# Patient Record
Sex: FEMALE | Race: WHITE | NOT HISPANIC OR LATINO | URBAN - METROPOLITAN AREA
[De-identification: names, ages, dates, MRNs, and addresses within clinical notes are randomized per-mention and may not be internally consistent; named-entity substitution may affect disease eponyms.]

---

## 2023-06-15 ENCOUNTER — OFFICE VISIT (OUTPATIENT)
Dept: PRIMARY CARE | Facility: CLINIC | Age: 58
End: 2023-06-15
Payer: COMMERCIAL

## 2023-06-15 VITALS
WEIGHT: 230 LBS | DIASTOLIC BLOOD PRESSURE: 71 MMHG | OXYGEN SATURATION: 96 % | SYSTOLIC BLOOD PRESSURE: 108 MMHG | BODY MASS INDEX: 45.16 KG/M2 | HEIGHT: 60 IN | HEART RATE: 73 BPM

## 2023-06-15 DIAGNOSIS — Z00.00 HEALTH CARE MAINTENANCE: ICD-10-CM

## 2023-06-15 DIAGNOSIS — Z12.11 SCREENING FOR MALIGNANT NEOPLASM OF COLON: ICD-10-CM

## 2023-06-15 DIAGNOSIS — F32.A DEPRESSION, UNSPECIFIED DEPRESSION TYPE: ICD-10-CM

## 2023-06-15 DIAGNOSIS — Z12.31 ENCOUNTER FOR SCREENING MAMMOGRAM FOR MALIGNANT NEOPLASM OF BREAST: Primary | ICD-10-CM

## 2023-06-15 DIAGNOSIS — E55.9 VITAMIN D DEFICIENCY: ICD-10-CM

## 2023-06-15 DIAGNOSIS — N89.8 VAGINAL DRYNESS: ICD-10-CM

## 2023-06-15 DIAGNOSIS — K64.4 SKIN TAG OF ANUS: ICD-10-CM

## 2023-06-15 PROCEDURE — 99214 OFFICE O/P EST MOD 30 MIN: CPT | Performed by: NURSE PRACTITIONER

## 2023-06-15 PROCEDURE — 1036F TOBACCO NON-USER: CPT | Performed by: NURSE PRACTITIONER

## 2023-06-15 RX ORDER — BUPROPION HYDROCHLORIDE 150 MG/1
150 TABLET ORAL EVERY MORNING
Qty: 30 TABLET | Refills: 1 | Status: SHIPPED | OUTPATIENT
Start: 2023-06-15 | End: 2023-07-05 | Stop reason: SDUPTHER

## 2023-06-15 RX ORDER — FLUTICASONE PROPIONATE 50 MCG
2 SPRAY, SUSPENSION (ML) NASAL DAILY
COMMUNITY
Start: 2019-05-14 | End: 2023-08-03 | Stop reason: SDUPTHER

## 2023-06-15 RX ORDER — ESCITALOPRAM OXALATE 20 MG/1
20 TABLET ORAL DAILY
COMMUNITY
End: 2023-06-15

## 2023-06-15 RX ORDER — ESTRADIOL 0.1 MG/G
4 CREAM VAGINAL DAILY
Qty: 42.5 G | Refills: 3 | Status: SHIPPED | OUTPATIENT
Start: 2023-06-15 | End: 2023-10-06 | Stop reason: SDUPTHER

## 2023-06-15 RX ORDER — LORATADINE 10 MG/1
10 TABLET ORAL
COMMUNITY

## 2023-06-15 RX ORDER — ALBUTEROL SULFATE 90 UG/1
2 AEROSOL, METERED RESPIRATORY (INHALATION) EVERY 4 HOURS PRN
COMMUNITY
Start: 2023-03-27 | End: 2024-04-19 | Stop reason: ALTCHOICE

## 2023-06-15 RX ORDER — ERGOCALCIFEROL 1.25 MG/1
1 CAPSULE ORAL
Qty: 12 CAPSULE | Refills: 0 | Status: SHIPPED | OUTPATIENT
Start: 2023-06-15 | End: 2023-07-05 | Stop reason: SDUPTHER

## 2023-06-15 RX ORDER — ESTRADIOL 0.1 MG/G
CREAM VAGINAL
COMMUNITY
Start: 2021-08-19 | End: 2023-06-15 | Stop reason: SDUPTHER

## 2023-06-15 RX ORDER — ERGOCALCIFEROL 1.25 MG/1
1 CAPSULE ORAL 2 TIMES WEEKLY
COMMUNITY
End: 2023-06-15 | Stop reason: SDUPTHER

## 2023-06-15 RX ORDER — UBIDECARENONE 30 MG
CAPSULE ORAL
COMMUNITY
Start: 2017-12-05

## 2023-06-15 ASSESSMENT — ENCOUNTER SYMPTOMS
DIZZINESS: 0
PALPITATIONS: 0
HEADACHES: 0
NAUSEA: 0
NUMBNESS: 0
WEAKNESS: 0
CONSTIPATION: 0
CHILLS: 0
COUGH: 0
SHORTNESS OF BREATH: 0
ABDOMINAL PAIN: 0
MUSCULOSKELETAL NEGATIVE: 1
DIARRHEA: 0
VOMITING: 0
PSYCHIATRIC NEGATIVE: 1
SORE THROAT: 0
FATIGUE: 0
FEVER: 0

## 2023-06-15 ASSESSMENT — PATIENT HEALTH QUESTIONNAIRE - PHQ9
1. LITTLE INTEREST OR PLEASURE IN DOING THINGS: NOT AT ALL
SUM OF ALL RESPONSES TO PHQ9 QUESTIONS 1 AND 2: 0
2. FEELING DOWN, DEPRESSED OR HOPELESS: NOT AT ALL

## 2023-06-15 NOTE — PROGRESS NOTES
"Subjective   Patient ID: Irina Joaquin is a 58 y.o. female who presents for follow up.    HPI   Patient here for follow up.  Feels like Lexapro is not working for her.  Has been on Wellbutrin in the past and it worked well for her.  Denies chest pain, SOB, palpitations, dizziness,  or GI issues.  Has anal skin tag that is painful and irritating.  Would like it looked at.      Review of Systems   Constitutional:  Negative for chills, fatigue and fever.   HENT:  Negative for congestion, ear pain and sore throat.    Eyes:  Negative for visual disturbance.   Respiratory:  Negative for cough and shortness of breath.    Cardiovascular:  Negative for chest pain, palpitations and leg swelling.   Gastrointestinal:  Negative for abdominal pain, constipation, diarrhea, nausea and vomiting.   Genitourinary: Negative.    Musculoskeletal: Negative.    Skin:  Negative for rash.   Neurological:  Negative for dizziness, weakness, numbness and headaches.   Psychiatric/Behavioral: Negative.         Objective   /71   Pulse 73   Ht 1.518 m (4' 11.75\")   Wt 104 kg (230 lb)   SpO2 96%   BMI 45.30 kg/m²     Physical Exam  Constitutional:       General: She is not in acute distress.     Appearance: Normal appearance.   HENT:      Head: Normocephalic and atraumatic.      Right Ear: Tympanic membrane, ear canal and external ear normal.      Left Ear: Tympanic membrane, ear canal and external ear normal.      Nose: Nose normal.      Mouth/Throat:      Mouth: Mucous membranes are moist.      Pharynx: Oropharynx is clear.   Eyes:      Extraocular Movements: Extraocular movements intact.      Conjunctiva/sclera: Conjunctivae normal.      Pupils: Pupils are equal, round, and reactive to light.   Cardiovascular:      Rate and Rhythm: Normal rate and regular rhythm.      Pulses: Normal pulses.      Heart sounds: Normal heart sounds. No murmur heard.  Pulmonary:      Effort: Pulmonary effort is normal.      Breath sounds: Normal " breath sounds. No wheezing, rhonchi or rales.   Abdominal:      General: Bowel sounds are normal.      Palpations: Abdomen is soft.      Tenderness: There is no abdominal tenderness.   Musculoskeletal:         General: Normal range of motion.      Cervical back: Normal range of motion and neck supple.   Lymphadenopathy:      Comments: No lymphadenopathy noted   Skin:     General: Skin is warm and dry.      Findings: No rash.      Comments: Large skin tag with very small stem located close to anus.   Neurological:      General: No focal deficit present.      Mental Status: She is alert and oriented to person, place, and time.      Cranial Nerves: No cranial nerve deficit.      Coordination: Coordination normal.      Gait: Gait normal.   Psychiatric:         Mood and Affect: Mood normal.         Behavior: Behavior normal.         Assessment/Plan   Problem List Items Addressed This Visit          Genitourinary    Vaginal dryness     Continue estrogen cream as prescribed         Relevant Medications    estradiol (Estrace) 0.1 MG/GM vaginal cream       Endocrine/Metabolic    Vitamin D deficiency     Restart vitamin D 50,000 units once weekly for 12 weeks, then continue 2000 units capsule over-the-counter daily.  Checking vitamin D level now         Relevant Medications    ergocalciferol (Vitamin D-2) 1.25 MG (03109 UT) capsule    Other Relevant Orders    Vitamin D, Total       Other    Depression     Stop Lexapro.  Start Wellbutrin 150 mg today  We will follow-up in 6 weeks to see how you are doing on medications         Relevant Medications    buPROPion XL (Wellbutrin XL) 150 mg 24 hr tablet    Screening for malignant neoplasm of colon - Primary    Relevant Orders    Cologuard® colon cancer screening     Other Visit Diagnoses       Health care maintenance        Relevant Orders    Comprehensive Metabolic Panel    TSH with reflex to Free T4 if abnormal    Lipid Panel    CBC and Auto Differential        Skin tag of anus  removed.  Area prepped and cleaned.  1% lidocaine administered.  Small stab of skin tag  with scalpel.  Minimal bleeding.  Patient tolerated well without pain.  Treated area with triple antibiotic cream

## 2023-06-15 NOTE — ASSESSMENT & PLAN NOTE
Stop Lexapro.  Start Wellbutrin 150 mg today  We will follow-up in 6 weeks to see how you are doing on medications

## 2023-06-15 NOTE — ASSESSMENT & PLAN NOTE
Restart vitamin D 50,000 units once weekly for 12 weeks, then continue 2000 units capsule over-the-counter daily.  Checking vitamin D level now

## 2023-06-23 ENCOUNTER — LAB (OUTPATIENT)
Dept: LAB | Facility: LAB | Age: 58
End: 2023-06-23
Payer: COMMERCIAL

## 2023-06-23 DIAGNOSIS — E55.9 VITAMIN D DEFICIENCY: ICD-10-CM

## 2023-06-23 DIAGNOSIS — Z00.00 HEALTH CARE MAINTENANCE: ICD-10-CM

## 2023-06-23 LAB
ALANINE AMINOTRANSFERASE (SGPT) (U/L) IN SER/PLAS: 12 U/L (ref 7–45)
ALBUMIN (G/DL) IN SER/PLAS: 3.9 G/DL (ref 3.4–5)
ALKALINE PHOSPHATASE (U/L) IN SER/PLAS: 94 U/L (ref 33–136)
ANION GAP IN SER/PLAS: 13 MMOL/L (ref 10–20)
ASPARTATE AMINOTRANSFERASE (SGOT) (U/L) IN SER/PLAS: 13 U/L (ref 9–39)
BASOPHILS (10*3/UL) IN BLOOD BY AUTOMATED COUNT: 0.02 X10E9/L (ref 0–0.1)
BASOPHILS/100 LEUKOCYTES IN BLOOD BY AUTOMATED COUNT: 0.3 % (ref 0–2)
BILIRUBIN TOTAL (MG/DL) IN SER/PLAS: 0.6 MG/DL (ref 0–1.2)
CALCIDIOL (25 OH VITAMIN D3) (NG/ML) IN SER/PLAS: 32 NG/ML
CALCIUM (MG/DL) IN SER/PLAS: 8.9 MG/DL (ref 8.6–10.6)
CARBON DIOXIDE, TOTAL (MMOL/L) IN SER/PLAS: 25 MMOL/L (ref 21–32)
CHLORIDE (MMOL/L) IN SER/PLAS: 109 MMOL/L (ref 98–107)
CHOLESTEROL (MG/DL) IN SER/PLAS: 168 MG/DL (ref 0–199)
CHOLESTEROL IN HDL (MG/DL) IN SER/PLAS: 47.8 MG/DL
CHOLESTEROL/HDL RATIO: 3.5
CREATININE (MG/DL) IN SER/PLAS: 0.62 MG/DL (ref 0.5–1.05)
EOSINOPHILS (10*3/UL) IN BLOOD BY AUTOMATED COUNT: 0 X10E9/L (ref 0–0.7)
EOSINOPHILS/100 LEUKOCYTES IN BLOOD BY AUTOMATED COUNT: 0 % (ref 0–6)
ERYTHROCYTE DISTRIBUTION WIDTH (RATIO) BY AUTOMATED COUNT: 14.9 % (ref 11.5–14.5)
ERYTHROCYTE MEAN CORPUSCULAR HEMOGLOBIN CONCENTRATION (G/DL) BY AUTOMATED: 30.4 G/DL (ref 32–36)
ERYTHROCYTE MEAN CORPUSCULAR VOLUME (FL) BY AUTOMATED COUNT: 81 FL (ref 80–100)
ERYTHROCYTES (10*6/UL) IN BLOOD BY AUTOMATED COUNT: 4.81 X10E12/L (ref 4–5.2)
GFR FEMALE: >90 ML/MIN/1.73M2
GLUCOSE (MG/DL) IN SER/PLAS: 95 MG/DL (ref 74–99)
HEMATOCRIT (%) IN BLOOD BY AUTOMATED COUNT: 39.1 % (ref 36–46)
HEMOGLOBIN (G/DL) IN BLOOD: 11.9 G/DL (ref 12–16)
IMMATURE GRANULOCYTES/100 LEUKOCYTES IN BLOOD BY AUTOMATED COUNT: 0.2 % (ref 0–0.9)
LDL: 103 MG/DL (ref 0–99)
LEUKOCYTES (10*3/UL) IN BLOOD BY AUTOMATED COUNT: 5.8 X10E9/L (ref 4.4–11.3)
LYMPHOCYTES (10*3/UL) IN BLOOD BY AUTOMATED COUNT: 2.15 X10E9/L (ref 1.2–4.8)
LYMPHOCYTES/100 LEUKOCYTES IN BLOOD BY AUTOMATED COUNT: 37.1 % (ref 13–44)
MONOCYTES (10*3/UL) IN BLOOD BY AUTOMATED COUNT: 0.7 X10E9/L (ref 0.1–1)
MONOCYTES/100 LEUKOCYTES IN BLOOD BY AUTOMATED COUNT: 12.1 % (ref 2–10)
NEUTROPHILS (10*3/UL) IN BLOOD BY AUTOMATED COUNT: 2.91 X10E9/L (ref 1.2–7.7)
NEUTROPHILS/100 LEUKOCYTES IN BLOOD BY AUTOMATED COUNT: 50.3 % (ref 40–80)
NRBC (PER 100 WBCS) BY AUTOMATED COUNT: 0 /100 WBC (ref 0–0)
PLATELETS (10*3/UL) IN BLOOD AUTOMATED COUNT: 265 X10E9/L (ref 150–450)
POTASSIUM (MMOL/L) IN SER/PLAS: 4.4 MMOL/L (ref 3.5–5.3)
PROTEIN TOTAL: 5.9 G/DL (ref 6.4–8.2)
SODIUM (MMOL/L) IN SER/PLAS: 143 MMOL/L (ref 136–145)
THYROTROPIN (MIU/L) IN SER/PLAS BY DETECTION LIMIT <= 0.05 MIU/L: 2.9 MIU/L (ref 0.44–3.98)
TRIGLYCERIDE (MG/DL) IN SER/PLAS: 88 MG/DL (ref 0–149)
UREA NITROGEN (MG/DL) IN SER/PLAS: 17 MG/DL (ref 6–23)
VLDL: 18 MG/DL (ref 0–40)

## 2023-06-23 PROCEDURE — 80061 LIPID PANEL: CPT

## 2023-06-23 PROCEDURE — 36415 COLL VENOUS BLD VENIPUNCTURE: CPT

## 2023-06-23 PROCEDURE — 82306 VITAMIN D 25 HYDROXY: CPT

## 2023-06-23 PROCEDURE — 80053 COMPREHEN METABOLIC PANEL: CPT

## 2023-06-23 PROCEDURE — 84443 ASSAY THYROID STIM HORMONE: CPT

## 2023-06-23 PROCEDURE — 85025 COMPLETE CBC W/AUTO DIFF WBC: CPT

## 2023-06-28 ENCOUNTER — APPOINTMENT (OUTPATIENT)
Dept: PRIMARY CARE | Facility: CLINIC | Age: 58
End: 2023-06-28
Payer: COMMERCIAL

## 2023-07-04 LAB — NONINV COLON CA DNA+OCC BLD SCRN STL QL: NEGATIVE

## 2023-07-05 ENCOUNTER — OFFICE VISIT (OUTPATIENT)
Dept: PRIMARY CARE | Facility: CLINIC | Age: 58
End: 2023-07-05
Payer: COMMERCIAL

## 2023-07-05 VITALS
OXYGEN SATURATION: 95 % | DIASTOLIC BLOOD PRESSURE: 79 MMHG | WEIGHT: 230 LBS | SYSTOLIC BLOOD PRESSURE: 124 MMHG | HEIGHT: 60 IN | HEART RATE: 98 BPM | BODY MASS INDEX: 45.16 KG/M2

## 2023-07-05 DIAGNOSIS — M17.12 PRIMARY OSTEOARTHRITIS OF LEFT KNEE: ICD-10-CM

## 2023-07-05 DIAGNOSIS — F32.A DEPRESSION, UNSPECIFIED DEPRESSION TYPE: ICD-10-CM

## 2023-07-05 DIAGNOSIS — N89.8 VAGINAL DRYNESS: ICD-10-CM

## 2023-07-05 DIAGNOSIS — M25.562 PAIN IN BOTH KNEES, UNSPECIFIED CHRONICITY: ICD-10-CM

## 2023-07-05 DIAGNOSIS — E55.9 VITAMIN D DEFICIENCY: ICD-10-CM

## 2023-07-05 DIAGNOSIS — M25.561 PAIN IN BOTH KNEES, UNSPECIFIED CHRONICITY: ICD-10-CM

## 2023-07-05 DIAGNOSIS — R53.83 OTHER FATIGUE: ICD-10-CM

## 2023-07-05 DIAGNOSIS — D64.9 ANEMIA, UNSPECIFIED TYPE: Primary | ICD-10-CM

## 2023-07-05 PROCEDURE — 99214 OFFICE O/P EST MOD 30 MIN: CPT | Performed by: NURSE PRACTITIONER

## 2023-07-05 PROCEDURE — 1036F TOBACCO NON-USER: CPT | Performed by: NURSE PRACTITIONER

## 2023-07-05 RX ORDER — BUPROPION HYDROCHLORIDE 150 MG/1
150 TABLET ORAL EVERY MORNING
Qty: 90 TABLET | Refills: 1 | Status: SHIPPED | OUTPATIENT
Start: 2023-07-05 | End: 2023-08-03 | Stop reason: SDUPTHER

## 2023-07-05 RX ORDER — ERGOCALCIFEROL 1.25 MG/1
1 CAPSULE ORAL
Qty: 12 CAPSULE | Refills: 0 | Status: SHIPPED | OUTPATIENT
Start: 2023-07-05 | End: 2023-08-18 | Stop reason: ALTCHOICE

## 2023-07-05 ASSESSMENT — ENCOUNTER SYMPTOMS
MUSCULOSKELETAL NEGATIVE: 1
NAUSEA: 0
FATIGUE: 1
PALPITATIONS: 0
HEADACHES: 0
CONSTIPATION: 0
NUMBNESS: 0
WEAKNESS: 0
SHORTNESS OF BREATH: 0
ABDOMINAL PAIN: 0
VOMITING: 0
DIZZINESS: 0
SORE THROAT: 0
DIARRHEA: 0
COUGH: 0
CHILLS: 0
FEVER: 0
PSYCHIATRIC NEGATIVE: 1

## 2023-07-05 NOTE — PROGRESS NOTES
"Subjective   Patient ID: Irina Joaquin is a 58 y.o. female who presents for follow up.     HPI   Here for medication follow-up.  So far mood is good on Wellbutrin  Is having increased knee pain.  Would like orthopedic referral.  Has flesh colored mole on the side of her face.  Friend said she should get it checked out.  Has Derm appointment in November.  Also has 2 moles 1 on her upper lip and one on her left cheek that are dark have been unchanged for a long time.  Denies chest pain, SOB, palpitations, dizziness,  or GI issues.    Reviewed lab results and mammogram results    Review of Systems   Constitutional:  Positive for fatigue. Negative for chills and fever.   HENT:  Negative for congestion, ear pain and sore throat.    Eyes:  Negative for visual disturbance.   Respiratory:  Negative for cough and shortness of breath.    Cardiovascular:  Negative for chest pain, palpitations and leg swelling.   Gastrointestinal:  Negative for abdominal pain, constipation, diarrhea, nausea and vomiting.   Genitourinary: Negative.    Musculoskeletal: Negative.    Skin:  Negative for rash.   Neurological:  Negative for dizziness, weakness, numbness and headaches.   Psychiatric/Behavioral: Negative.         Objective   /79   Pulse 98   Ht 1.518 m (4' 11.75\")   Wt 104 kg (230 lb)   SpO2 95%   BMI 45.30 kg/m²     Physical Exam  Constitutional:       General: She is not in acute distress.     Appearance: Normal appearance.   HENT:      Head: Normocephalic and atraumatic.      Right Ear: Tympanic membrane, ear canal and external ear normal.      Left Ear: Tympanic membrane, ear canal and external ear normal.      Nose: Nose normal.      Mouth/Throat:      Mouth: Mucous membranes are moist.      Pharynx: Oropharynx is clear.   Eyes:      Extraocular Movements: Extraocular movements intact.      Conjunctiva/sclera: Conjunctivae normal.      Pupils: Pupils are equal, round, and reactive to light.   Cardiovascular:      " Rate and Rhythm: Normal rate and regular rhythm.      Pulses: Normal pulses.      Heart sounds: Normal heart sounds. No murmur heard.  Pulmonary:      Effort: Pulmonary effort is normal.      Breath sounds: Normal breath sounds. No wheezing, rhonchi or rales.   Abdominal:      General: Bowel sounds are normal.      Palpations: Abdomen is soft.      Tenderness: There is no abdominal tenderness.   Musculoskeletal:         General: Normal range of motion.      Cervical back: Normal range of motion and neck supple.   Lymphadenopathy:      Comments: No lymphadenopathy noted   Skin:     General: Skin is warm and dry.      Findings: No rash.   Neurological:      General: No focal deficit present.      Mental Status: She is alert and oriented to person, place, and time.      Cranial Nerves: No cranial nerve deficit.      Coordination: Coordination normal.      Gait: Gait normal.   Psychiatric:         Mood and Affect: Mood normal.         Behavior: Behavior normal.       Assessment/Plan   Problem List Items Addressed This Visit       Depression     Continue Wellbutrin as prescribed  We will have medication check again in 3 months         Relevant Medications    buPROPion XL (Wellbutrin XL) 150 mg 24 hr tablet    Vitamin D deficiency     Continue vitamin D 50,000 units every week for 12 weeks following initial 12 weeks we will check vitamin D at 6-month visit         Relevant Medications    ergocalciferol (Vitamin D-2) 1.25 MG (52533 UT) capsule    Vaginal dryness     Continue vaginal estrogen cream as prescribed         Screening for malignant neoplasm of colon    Fatigue     We will check iron and ferritin levels.  Has history of iron infusions         Anemia - Primary    Relevant Orders    Iron and TIBC    Ferritin    Primary osteoarthritis of left knee     Orthopedic referral for bilateral knee pain          Other Visit Diagnoses       Pain in both knees, unspecified chronicity        Relevant Orders    Referral to  Orthopaedic Surgery

## 2023-07-05 NOTE — ASSESSMENT & PLAN NOTE
Continue vitamin D 50,000 units every week for 12 weeks following initial 12 weeks we will check vitamin D at 6-month visit

## 2023-07-12 ENCOUNTER — LAB (OUTPATIENT)
Dept: LAB | Facility: LAB | Age: 58
End: 2023-07-12
Payer: COMMERCIAL

## 2023-07-12 DIAGNOSIS — D64.9 ANEMIA, UNSPECIFIED TYPE: ICD-10-CM

## 2023-07-12 LAB
FERRITIN (UG/LL) IN SER/PLAS: 58 UG/L (ref 8–150)
IRON (UG/DL) IN SER/PLAS: 56 UG/DL (ref 35–150)
IRON BINDING CAPACITY (UG/DL) IN SER/PLAS: 439 UG/DL (ref 240–445)
IRON SATURATION (%) IN SER/PLAS: 13 % (ref 25–45)

## 2023-07-12 PROCEDURE — 36415 COLL VENOUS BLD VENIPUNCTURE: CPT

## 2023-07-12 PROCEDURE — 82728 ASSAY OF FERRITIN: CPT

## 2023-07-12 PROCEDURE — 83540 ASSAY OF IRON: CPT

## 2023-07-12 PROCEDURE — 83550 IRON BINDING TEST: CPT

## 2023-07-22 LAB — URINE CULTURE: NO GROWTH

## 2023-08-02 ASSESSMENT — ENCOUNTER SYMPTOMS
VOMITING: 0
DIZZINESS: 0
SORE THROAT: 0
NUMBNESS: 0
FEVER: 0
WEAKNESS: 0
DIARRHEA: 0
ABDOMINAL PAIN: 0
MUSCULOSKELETAL NEGATIVE: 1
NAUSEA: 0
PALPITATIONS: 0
HEADACHES: 0
CONSTIPATION: 0
PSYCHIATRIC NEGATIVE: 1
CHILLS: 0

## 2023-08-02 NOTE — PROGRESS NOTES
"Subjective   Patient ID: Irina Joaquin is a 58 y.o. female who presents for cough.      HPI   patient here today for persistent cough and congestion.   Onset: 7/21/23  congestion, cough, fever, cold sore, was on Doxy given to her by urgent care. Finished course on Monday.   Symptoms continue. Very SOB with any exertion.  Continues to cough with talking.  Unable to sleep lying flat has to sit up in chair.  Has been using albuterol inhaler with no improvement.  Feels like antibiotic may have helped her some but still feeling poorly.  Pulse ox has been stable.  Denies chest pain, palpitations, dizziness,  or GI issues.  Taking medications as prescribed   Feeling good on bupropion, would like to continue.  Hx of gastric bypass.    Review of Systems   Constitutional:  Positive for fatigue. Negative for chills and fever.   HENT:  Positive for congestion, sinus pressure and sinus pain. Negative for ear pain and sore throat.    Eyes:  Negative for visual disturbance.   Respiratory:  Positive for cough and shortness of breath.    Cardiovascular:  Negative for chest pain, palpitations and leg swelling.   Gastrointestinal:  Negative for abdominal pain, constipation, diarrhea, nausea and vomiting.   Genitourinary: Negative.    Musculoskeletal: Negative.    Skin:  Negative for rash.   Neurological:  Negative for dizziness, weakness, numbness and headaches.   Psychiatric/Behavioral: Negative.         Objective   /60   Pulse 106   Ht 1.518 m (4' 11.75\")   Wt 102 kg (225 lb)   SpO2 96%   BMI 44.31 kg/m²     Physical Exam  Constitutional:       General: She is not in acute distress.     Appearance: Normal appearance.   HENT:      Head: Normocephalic and atraumatic.      Right Ear: Tympanic membrane, ear canal and external ear normal.      Left Ear: Tympanic membrane, ear canal and external ear normal.      Nose: Nose normal.      Mouth/Throat:      Mouth: Mucous membranes are moist.      Pharynx: Oropharynx is clear. "   Eyes:      Extraocular Movements: Extraocular movements intact.      Conjunctiva/sclera: Conjunctivae normal.      Pupils: Pupils are equal, round, and reactive to light.   Cardiovascular:      Rate and Rhythm: Normal rate and regular rhythm.      Pulses: Normal pulses.      Heart sounds: Normal heart sounds. No murmur heard.  Pulmonary:      Effort: Pulmonary effort is normal.      Breath sounds: Normal breath sounds. Decreased air movement present. No wheezing, rhonchi or rales.   Abdominal:      General: Bowel sounds are normal.      Palpations: Abdomen is soft.      Tenderness: There is no abdominal tenderness.   Musculoskeletal:         General: Normal range of motion.      Cervical back: Normal range of motion and neck supple.   Lymphadenopathy:      Comments: No lymphadenopathy noted   Skin:     General: Skin is warm and dry.      Findings: No rash.   Neurological:      General: No focal deficit present.      Mental Status: She is alert and oriented to person, place, and time.      Cranial Nerves: No cranial nerve deficit.      Coordination: Coordination normal.      Gait: Gait normal.   Psychiatric:         Mood and Affect: Mood normal.         Behavior: Behavior normal.       Assessment/Plan   Problem List Items Addressed This Visit       Depression     Continue Wellbutrin as prescribed          Relevant Medications    buPROPion XL (Wellbutrin XL) 150 mg 24 hr tablet    Upper respiratory tract infection - Primary    Relevant Medications    albuterol 2.5 mg /3 mL (0.083 %) nebulizer solution    cephalexin (Keflex) 500 mg capsule    fluticasone (Flonase) 50 mcg/actuation nasal spray    furosemide (Lasix) 40 mg tablet    Other Relevant Orders    XR chest 2 views    Aerosol Machine for home use - Purchase    Dyspnea    Relevant Medications    albuterol 2.5 mg /3 mL (0.083 %) nebulizer solution    cephalexin (Keflex) 500 mg capsule    fluticasone (Flonase) 50 mcg/actuation nasal spray    furosemide (Lasix) 40  mg tablet    Other Relevant Orders    XR chest 2 views    Aerosol Machine for home use - Purchase

## 2023-08-03 ENCOUNTER — APPOINTMENT (OUTPATIENT)
Dept: PRIMARY CARE | Facility: CLINIC | Age: 58
End: 2023-08-03
Payer: COMMERCIAL

## 2023-08-03 ENCOUNTER — OFFICE VISIT (OUTPATIENT)
Dept: PRIMARY CARE | Facility: CLINIC | Age: 58
End: 2023-08-03
Payer: COMMERCIAL

## 2023-08-03 VITALS
WEIGHT: 225 LBS | HEIGHT: 60 IN | HEART RATE: 106 BPM | OXYGEN SATURATION: 96 % | SYSTOLIC BLOOD PRESSURE: 122 MMHG | BODY MASS INDEX: 44.17 KG/M2 | DIASTOLIC BLOOD PRESSURE: 60 MMHG

## 2023-08-03 DIAGNOSIS — R06.00 DYSPNEA, UNSPECIFIED TYPE: ICD-10-CM

## 2023-08-03 DIAGNOSIS — J06.9 UPPER RESPIRATORY TRACT INFECTION, UNSPECIFIED TYPE: ICD-10-CM

## 2023-08-03 DIAGNOSIS — F32.A DEPRESSION, UNSPECIFIED DEPRESSION TYPE: ICD-10-CM

## 2023-08-03 DIAGNOSIS — J06.9 UPPER RESPIRATORY TRACT INFECTION, UNSPECIFIED TYPE: Primary | ICD-10-CM

## 2023-08-03 PROBLEM — K59.00 CONSTIPATION: Status: ACTIVE | Noted: 2023-08-03

## 2023-08-03 PROBLEM — J32.9 SINOBRONCHITIS: Status: ACTIVE | Noted: 2023-08-03

## 2023-08-03 PROBLEM — K91.2 MALNUTRITION FOLLOWING GASTROINTESTINAL SURGERY (HHS-HCC): Status: ACTIVE | Noted: 2023-08-03

## 2023-08-03 PROBLEM — R09.81 SINUS CONGESTION: Status: ACTIVE | Noted: 2023-08-03

## 2023-08-03 PROBLEM — Z20.822 SUSPECTED COVID-19 VIRUS INFECTION: Status: ACTIVE | Noted: 2023-08-03

## 2023-08-03 PROBLEM — R43.0 ANOSMIA: Status: ACTIVE | Noted: 2023-08-03

## 2023-08-03 PROBLEM — J02.9 ACUTE PHARYNGITIS: Status: ACTIVE | Noted: 2023-08-03

## 2023-08-03 PROBLEM — B97.7 HPV (HUMAN PAPILLOMA VIRUS) INFECTION: Status: ACTIVE | Noted: 2023-08-03

## 2023-08-03 PROBLEM — N87.0 MILD CERVICAL DYSPLASIA: Status: ACTIVE | Noted: 2023-08-03

## 2023-08-03 PROBLEM — L08.9 SOFT TISSUE INFECTION: Status: ACTIVE | Noted: 2023-08-03

## 2023-08-03 PROBLEM — Z98.84 S/P BARIATRIC SURGERY: Status: ACTIVE | Noted: 2023-08-03

## 2023-08-03 PROBLEM — H69.91 DYSFUNCTION OF RIGHT EUSTACHIAN TUBE: Status: ACTIVE | Noted: 2023-08-03

## 2023-08-03 PROBLEM — H66.90 ACUTE OTITIS MEDIA: Status: ACTIVE | Noted: 2023-08-03

## 2023-08-03 PROBLEM — Z98.84 GASTRIC BYPASS STATUS FOR OBESITY: Status: ACTIVE | Noted: 2023-08-03

## 2023-08-03 PROBLEM — M77.50 CALCANEAL BURSITIS (HEEL): Status: ACTIVE | Noted: 2017-03-17

## 2023-08-03 PROBLEM — K22.0 APERISTALSIS OF ESOPHAGUS: Status: ACTIVE | Noted: 2023-08-03

## 2023-08-03 PROBLEM — J34.89 NASAL CONGESTION WITH RHINORRHEA: Status: ACTIVE | Noted: 2023-08-03

## 2023-08-03 PROBLEM — J40 SINOBRONCHITIS: Status: ACTIVE | Noted: 2023-08-03

## 2023-08-03 PROBLEM — T81.49XA WOUND INFECTION AFTER SURGERY: Status: ACTIVE | Noted: 2023-08-03

## 2023-08-03 PROBLEM — R09.81 NASAL CONGESTION WITH RHINORRHEA: Status: ACTIVE | Noted: 2023-08-03

## 2023-08-03 PROBLEM — R51.9 HEADACHE: Status: ACTIVE | Noted: 2023-08-03

## 2023-08-03 PROBLEM — R11.10 REGURGITATION OF FOOD: Status: ACTIVE | Noted: 2023-08-03

## 2023-08-03 PROBLEM — R21 RASH: Status: ACTIVE | Noted: 2023-08-03

## 2023-08-03 PROBLEM — N39.46 URGE AND STRESS INCONTINENCE: Status: ACTIVE | Noted: 2023-08-03

## 2023-08-03 PROBLEM — M72.2 PLANTAR FASCIITIS OF RIGHT FOOT: Status: ACTIVE | Noted: 2017-02-10

## 2023-08-03 PROBLEM — R10.819 ABDOMINAL TENDERNESS: Status: ACTIVE | Noted: 2023-08-03

## 2023-08-03 PROCEDURE — 1036F TOBACCO NON-USER: CPT | Performed by: NURSE PRACTITIONER

## 2023-08-03 PROCEDURE — 99214 OFFICE O/P EST MOD 30 MIN: CPT | Performed by: NURSE PRACTITIONER

## 2023-08-03 RX ORDER — ALBUTEROL SULFATE 0.83 MG/ML
2.5 SOLUTION RESPIRATORY (INHALATION) 4 TIMES DAILY PRN
Qty: 25 ML | Refills: 3 | Status: SHIPPED | OUTPATIENT
Start: 2023-08-03 | End: 2024-01-19 | Stop reason: SDUPTHER

## 2023-08-03 RX ORDER — FUROSEMIDE 40 MG/1
40 TABLET ORAL DAILY
Qty: 30 TABLET | Refills: 0 | Status: SHIPPED | OUTPATIENT
Start: 2023-08-03 | End: 2024-04-19 | Stop reason: WASHOUT

## 2023-08-03 RX ORDER — FUROSEMIDE 40 MG/1
40 TABLET ORAL DAILY
Qty: 3 TABLET | Refills: 0 | OUTPATIENT
Start: 2023-08-03 | End: 2024-08-02

## 2023-08-03 RX ORDER — ALBUTEROL SULFATE 0.83 MG/ML
2.5 SOLUTION RESPIRATORY (INHALATION) 4 TIMES DAILY PRN
Qty: 25 ML | Refills: 3 | Status: SHIPPED | OUTPATIENT
Start: 2023-08-03 | End: 2023-08-03

## 2023-08-03 RX ORDER — BUPROPION HYDROCHLORIDE 150 MG/1
150 TABLET ORAL EVERY MORNING
Qty: 90 TABLET | Refills: 1 | Status: SHIPPED | OUTPATIENT
Start: 2023-08-03 | End: 2023-08-18 | Stop reason: SDUPTHER

## 2023-08-03 RX ORDER — FUROSEMIDE 40 MG/1
40 TABLET ORAL 2 TIMES DAILY
Qty: 60 TABLET | Refills: 11 | Status: SHIPPED | OUTPATIENT
Start: 2023-08-03 | End: 2023-08-03 | Stop reason: ENTERED-IN-ERROR

## 2023-08-03 RX ORDER — DOXYCYCLINE HYCLATE 100 MG
100 TABLET ORAL 2 TIMES DAILY
COMMUNITY
Start: 2023-07-24 | End: 2023-08-03 | Stop reason: ALTCHOICE

## 2023-08-03 RX ORDER — FUROSEMIDE 40 MG/1
40 TABLET ORAL DAILY
Qty: 3 TABLET | Refills: 0 | Status: SHIPPED | OUTPATIENT
Start: 2023-08-03 | End: 2023-08-03 | Stop reason: SDUPTHER

## 2023-08-03 RX ORDER — CEPHALEXIN 500 MG/1
500 CAPSULE ORAL 2 TIMES DAILY
Qty: 14 CAPSULE | Refills: 0 | Status: SHIPPED | OUTPATIENT
Start: 2023-08-03 | End: 2023-08-10

## 2023-08-03 RX ORDER — FLUTICASONE PROPIONATE 50 MCG
2 SPRAY, SUSPENSION (ML) NASAL DAILY
Qty: 16 G | Refills: 1 | Status: SHIPPED | OUTPATIENT
Start: 2023-08-03

## 2023-08-03 ASSESSMENT — ENCOUNTER SYMPTOMS
FATIGUE: 1
SHORTNESS OF BREATH: 1
SINUS PRESSURE: 1
COUGH: 1
SINUS PAIN: 1

## 2023-08-03 ASSESSMENT — PATIENT HEALTH QUESTIONNAIRE - PHQ9
1. LITTLE INTEREST OR PLEASURE IN DOING THINGS: NOT AT ALL
2. FEELING DOWN, DEPRESSED OR HOPELESS: NOT AT ALL
SUM OF ALL RESPONSES TO PHQ9 QUESTIONS 1 AND 2: 0

## 2023-08-03 NOTE — PATIENT INSTRUCTIONS
Chest x-ray today.  Start albuterol aerosols as needed, up to 4 times a day for shortness of breath.  Prescription for Keflex 500 mg twice a day for 7 days.  Hold until we get chest x-ray results  Start Mucinex decongestant and Flonase as directed for head congestion  Take Lasix 40 mg daily for 3 days for shortness of breath and orthopnea.

## 2023-08-03 NOTE — ASSESSMENT & PLAN NOTE
Have chest x-ray done  Start Keflex 500 mg twice daily for 7 days  Start albuterol aerosols as directed.  Prescription for aerosol and machine sent to [facility].  Start Lasix 40 mg x 3 days or shortness of breath

## 2023-08-18 ENCOUNTER — OFFICE VISIT (OUTPATIENT)
Dept: PRIMARY CARE | Facility: CLINIC | Age: 58
End: 2023-08-18
Payer: COMMERCIAL

## 2023-08-18 VITALS
OXYGEN SATURATION: 95 % | BODY MASS INDEX: 44.57 KG/M2 | DIASTOLIC BLOOD PRESSURE: 72 MMHG | HEART RATE: 82 BPM | SYSTOLIC BLOOD PRESSURE: 105 MMHG | HEIGHT: 60 IN | WEIGHT: 227 LBS

## 2023-08-18 DIAGNOSIS — E55.9 VITAMIN D DEFICIENCY: Primary | ICD-10-CM

## 2023-08-18 DIAGNOSIS — F32.A DEPRESSION, UNSPECIFIED DEPRESSION TYPE: ICD-10-CM

## 2023-08-18 PROCEDURE — 99214 OFFICE O/P EST MOD 30 MIN: CPT | Performed by: NURSE PRACTITIONER

## 2023-08-18 PROCEDURE — 1036F TOBACCO NON-USER: CPT | Performed by: NURSE PRACTITIONER

## 2023-08-18 RX ORDER — BUPROPION HYDROCHLORIDE 150 MG/1
150 TABLET ORAL EVERY MORNING
Qty: 90 TABLET | Refills: 1 | Status: SHIPPED | OUTPATIENT
Start: 2023-08-18 | End: 2023-10-06 | Stop reason: SDUPTHER

## 2023-08-18 ASSESSMENT — ENCOUNTER SYMPTOMS
FEVER: 0
CHILLS: 0
COUGH: 0
SORE THROAT: 0
NAUSEA: 0
FATIGUE: 0
HEADACHES: 0
ABDOMINAL PAIN: 0
VOMITING: 0
DIZZINESS: 0
WEAKNESS: 0
MUSCULOSKELETAL NEGATIVE: 1
PSYCHIATRIC NEGATIVE: 1
DIARRHEA: 0
SHORTNESS OF BREATH: 0
PALPITATIONS: 0
NUMBNESS: 0
CONSTIPATION: 0

## 2023-08-18 NOTE — PROGRESS NOTES
"Subjective   Patient ID: Irina Joaquin is a 58 y.o. female who presents for follow up.      HPI   Respiratory infection resolved.  Feeling better.  Going hiking this weekend . Will take albuterol with her.  No cough.  No congestion.  Normal allergies symptoms.    Review of Systems   Constitutional:  Negative for chills, fatigue and fever.   HENT:  Negative for congestion, ear pain and sore throat.    Eyes:  Negative for visual disturbance.   Respiratory:  Negative for cough and shortness of breath.    Cardiovascular:  Negative for chest pain, palpitations and leg swelling.   Gastrointestinal:  Negative for abdominal pain, constipation, diarrhea, nausea and vomiting.   Genitourinary: Negative.    Musculoskeletal: Negative.    Skin:  Negative for rash.   Neurological:  Negative for dizziness, weakness, numbness and headaches.   Psychiatric/Behavioral: Negative.         Objective   /72   Pulse 82   Ht 1.518 m (4' 11.75\")   Wt 103 kg (227 lb)   SpO2 95%   BMI 44.70 kg/m²     Physical Exam  Constitutional:       General: She is not in acute distress.     Appearance: Normal appearance.   HENT:      Head: Normocephalic and atraumatic.      Right Ear: Tympanic membrane, ear canal and external ear normal.      Left Ear: Tympanic membrane, ear canal and external ear normal.      Nose: Nose normal.      Mouth/Throat:      Mouth: Mucous membranes are moist.      Pharynx: Oropharynx is clear.   Eyes:      Extraocular Movements: Extraocular movements intact.      Conjunctiva/sclera: Conjunctivae normal.      Pupils: Pupils are equal, round, and reactive to light.   Cardiovascular:      Rate and Rhythm: Normal rate and regular rhythm.      Pulses: Normal pulses.      Heart sounds: Normal heart sounds. No murmur heard.  Pulmonary:      Effort: Pulmonary effort is normal.      Breath sounds: Normal breath sounds. No wheezing, rhonchi or rales.   Abdominal:      General: Bowel sounds are normal.      Palpations: Abdomen " is soft.      Tenderness: There is no abdominal tenderness.   Musculoskeletal:         General: Normal range of motion.      Cervical back: Normal range of motion and neck supple.   Lymphadenopathy:      Comments: No lymphadenopathy noted   Skin:     General: Skin is warm and dry.      Findings: No rash.   Neurological:      General: No focal deficit present.      Mental Status: She is alert and oriented to person, place, and time.      Cranial Nerves: No cranial nerve deficit.      Coordination: Coordination normal.      Gait: Gait normal.   Psychiatric:         Mood and Affect: Mood normal.         Behavior: Behavior normal.         Assessment/Plan   Problem List Items Addressed This Visit       Depression     Continue Wellbutrin 150 mg daily         Vitamin D deficiency - Primary     Start vitamin D3 2000 unit capsule over-the-counter daily

## 2023-08-18 NOTE — PATIENT INSTRUCTIONS
Start vitamin D 2000 unit capsule counter  Start iron supplement as discussed over-the-counter.  We will follow-up at scheduled visit for normal health maintenance.

## 2023-10-06 ENCOUNTER — OFFICE VISIT (OUTPATIENT)
Dept: PRIMARY CARE | Facility: CLINIC | Age: 58
End: 2023-10-06
Payer: COMMERCIAL

## 2023-10-06 VITALS
HEART RATE: 77 BPM | WEIGHT: 228 LBS | HEIGHT: 60 IN | OXYGEN SATURATION: 94 % | DIASTOLIC BLOOD PRESSURE: 72 MMHG | SYSTOLIC BLOOD PRESSURE: 104 MMHG | BODY MASS INDEX: 44.76 KG/M2

## 2023-10-06 DIAGNOSIS — M25.562 CHRONIC PAIN OF BOTH KNEES: Primary | ICD-10-CM

## 2023-10-06 DIAGNOSIS — N89.8 VAGINAL DRYNESS: ICD-10-CM

## 2023-10-06 DIAGNOSIS — G89.29 CHRONIC PAIN OF BOTH KNEES: Primary | ICD-10-CM

## 2023-10-06 DIAGNOSIS — R06.00 DYSPNEA, UNSPECIFIED TYPE: ICD-10-CM

## 2023-10-06 DIAGNOSIS — J06.9 UPPER RESPIRATORY TRACT INFECTION, UNSPECIFIED TYPE: ICD-10-CM

## 2023-10-06 DIAGNOSIS — F32.A DEPRESSION, UNSPECIFIED DEPRESSION TYPE: ICD-10-CM

## 2023-10-06 DIAGNOSIS — M25.561 CHRONIC PAIN OF BOTH KNEES: Primary | ICD-10-CM

## 2023-10-06 PROCEDURE — 90471 IMMUNIZATION ADMIN: CPT | Performed by: NURSE PRACTITIONER

## 2023-10-06 PROCEDURE — 99214 OFFICE O/P EST MOD 30 MIN: CPT | Performed by: NURSE PRACTITIONER

## 2023-10-06 PROCEDURE — 1036F TOBACCO NON-USER: CPT | Performed by: NURSE PRACTITIONER

## 2023-10-06 PROCEDURE — 90686 IIV4 VACC NO PRSV 0.5 ML IM: CPT | Performed by: NURSE PRACTITIONER

## 2023-10-06 RX ORDER — ESTRADIOL 0.1 MG/G
4 CREAM VAGINAL DAILY
Qty: 42.5 G | Refills: 3 | Status: SHIPPED | OUTPATIENT
Start: 2023-10-06 | End: 2024-04-19 | Stop reason: SDUPTHER

## 2023-10-06 RX ORDER — BUPROPION HYDROCHLORIDE 150 MG/1
150 TABLET ORAL EVERY MORNING
Qty: 90 TABLET | Refills: 1 | Status: SHIPPED | OUTPATIENT
Start: 2023-10-06

## 2023-10-06 ASSESSMENT — ENCOUNTER SYMPTOMS
ARTHRALGIAS: 1
SHORTNESS OF BREATH: 0
CHILLS: 0
NUMBNESS: 0
CONSTIPATION: 0
DIZZINESS: 0
NAUSEA: 0
WEAKNESS: 0
FEVER: 0
VOMITING: 0
FATIGUE: 0
COUGH: 0
PSYCHIATRIC NEGATIVE: 1
ABDOMINAL PAIN: 0
DIARRHEA: 0
HEADACHES: 0
SORE THROAT: 0
PALPITATIONS: 0

## 2023-10-06 NOTE — PROGRESS NOTES
"Subjective   Patient ID: Irina Kennedy is a 58 y.o. female who presents for medication follow up.      HPI   Overall doing well.  Having knee pain and went ortho.  Was not happy with care.  Did not receive order for PT.  Wants to know more about lymphedema.  Legs are tender to touch at times.  States she cannot lose weight because of knees pain and the inability to exercise.  Mood and sleep are good on Wellbutrin.  Life and job are stressful.  Denies chest pain, SOB, palpitations, dizziness,  or GI issues.  Taking medications as prescribed         Review of Systems   Constitutional:  Negative for chills, fatigue and fever.   HENT:  Negative for congestion, ear pain and sore throat.    Eyes:  Negative for visual disturbance.   Respiratory:  Negative for cough and shortness of breath.    Cardiovascular:  Negative for chest pain, palpitations and leg swelling.   Gastrointestinal:  Negative for abdominal pain, constipation, diarrhea, nausea and vomiting.   Genitourinary: Negative.    Musculoskeletal:  Positive for arthralgias.   Skin:  Negative for rash.   Neurological:  Negative for dizziness, weakness, numbness and headaches.   Psychiatric/Behavioral: Negative.         Objective   /72   Pulse 77   Ht 1.518 m (4' 11.75\")   Wt 103 kg (228 lb)   SpO2 94%   BMI 44.90 kg/m²     Physical Exam  Constitutional:       General: She is not in acute distress.     Appearance: Normal appearance.   HENT:      Head: Normocephalic and atraumatic.      Right Ear: Tympanic membrane, ear canal and external ear normal.      Left Ear: Tympanic membrane, ear canal and external ear normal.      Nose: Nose normal.      Mouth/Throat:      Mouth: Mucous membranes are moist.      Pharynx: Oropharynx is clear.   Eyes:      Extraocular Movements: Extraocular movements intact.      Conjunctiva/sclera: Conjunctivae normal.      Pupils: Pupils are equal, round, and reactive to light.   Cardiovascular:      Rate and Rhythm: Normal rate " and regular rhythm.      Pulses: Normal pulses.      Heart sounds: Normal heart sounds. No murmur heard.  Pulmonary:      Effort: Pulmonary effort is normal.      Breath sounds: Normal breath sounds. No wheezing, rhonchi or rales.   Abdominal:      General: Bowel sounds are normal.      Palpations: Abdomen is soft.      Tenderness: There is no abdominal tenderness.   Musculoskeletal:         General: Normal range of motion.      Cervical back: Normal range of motion and neck supple.   Lymphadenopathy:      Comments: No lymphadenopathy noted   Skin:     General: Skin is warm and dry.      Findings: No rash.   Neurological:      General: No focal deficit present.      Mental Status: She is alert and oriented to person, place, and time.      Cranial Nerves: No cranial nerve deficit.      Coordination: Coordination normal.      Gait: Gait normal.   Psychiatric:         Mood and Affect: Mood normal.         Behavior: Behavior normal.       Assessment/Plan   Problem List Items Addressed This Visit             ICD-10-CM    Depression F32.A     Continue on Wellbutrin 150 mg daily         Relevant Medications    buPROPion XL (Wellbutrin XL) 150 mg 24 hr tablet    Vaginal dryness N89.8    Relevant Medications    estradiol (Estrace) 0.01 % (0.1 mg/gram) vaginal cream    Upper respiratory tract infection J06.9    Dyspnea R06.00    Chronic pain of both knees - Primary M25.561, M25.562, G89.29     Referral for physical therapy for bilateral knee pain         Relevant Orders    Referral to Physical Therapy     Mammogram: up to date, repeat 6/2024   Colonoscopy: Cologuard negative, Repeat 6/2026  Flu  Shingle  Pneumonia  Follow up in 6 months or sooner if needed.

## 2023-11-09 ENCOUNTER — TELEMEDICINE (OUTPATIENT)
Dept: UROLOGY | Facility: CLINIC | Age: 58
End: 2023-11-09
Payer: COMMERCIAL

## 2023-11-09 DIAGNOSIS — N32.81 OAB (OVERACTIVE BLADDER): Primary | ICD-10-CM

## 2023-11-09 PROCEDURE — 99442 PR PHYS/QHP TELEPHONE EVALUATION 11-20 MIN: CPT | Performed by: STUDENT IN AN ORGANIZED HEALTH CARE EDUCATION/TRAINING PROGRAM

## 2023-11-09 NOTE — PROGRESS NOTES
CHIEF COMPLAINT:  Virtual FUV    HISTORY OF PRESENT ILLNESS:  This is a 58 y.o. female, who presents with a virtual follow up visit. Patient reports she is doing well. She has had two accidents since her recent Botox treatment. She would like to do a higher dosage of Botox next time because it does not seem to work as well as it did when she first tried it. Patient would like to do 200 units of Botox at next visit.               Past Medical History  She has a past medical history of Personal history of other diseases of the circulatory system and Personal history of other diseases of the nervous system and sense organs (2013).    Surgical History  She has a past surgical history that includes  section, classic (2013); Tonsillectomy (2013); Sinus surgery (2013); Cholecystectomy (08/15/2020); Gastric restriction surgery (08/15/2020); Other surgical history (2019); Rotator cuff repair (2016); Other surgical history (08/15/2020); Other surgical history (2017); and Other surgical history (2014).     Social History  She reports that she has never smoked. She has never used smokeless tobacco. She reports current alcohol use of about 2.0 standard drinks of alcohol per week. She reports that she does not use drugs.    Family History  No family history on file.     Allergies  Heparin; Heparin analogues; Nsaids (non-steroidal anti-inflammatory drug); Quaternary ammonium cmpds,c12-c16 alkyldimethyl,cl; and Penicillins      A comprehensive 10+ review of systems was negative except for: see hpi                     Assessment:    58-year-old status post sling 2021 with persistent urinary urgency     UUI:   has failed myrbetriq and oxybutynin  s/p botox 100 units 3/31/2022, 22  s/p Botox, 6/15/2023, 100# units, not working as well as before  s/p botox, 100 units, 2023  Plan on 200 units when ready for next injection    Follow up in 6 weeks by phone / virtual  visit    Kodak Culver MD

## 2023-11-10 ENCOUNTER — APPOINTMENT (OUTPATIENT)
Dept: DERMATOLOGY | Facility: CLINIC | Age: 58
End: 2023-11-10
Payer: COMMERCIAL

## 2023-11-27 ENCOUNTER — OFFICE VISIT (OUTPATIENT)
Dept: DERMATOLOGY | Facility: CLINIC | Age: 58
End: 2023-11-27
Payer: COMMERCIAL

## 2023-11-27 DIAGNOSIS — L72.0 MILIA: ICD-10-CM

## 2023-11-27 DIAGNOSIS — D22.9 MULTIPLE BENIGN NEVI: ICD-10-CM

## 2023-11-27 DIAGNOSIS — L73.8 SEBACEOUS HYPERPLASIA OF FACE: Primary | ICD-10-CM

## 2023-11-27 PROCEDURE — 1036F TOBACCO NON-USER: CPT | Performed by: STUDENT IN AN ORGANIZED HEALTH CARE EDUCATION/TRAINING PROGRAM

## 2023-11-27 PROCEDURE — 99203 OFFICE O/P NEW LOW 30 MIN: CPT | Performed by: STUDENT IN AN ORGANIZED HEALTH CARE EDUCATION/TRAINING PROGRAM

## 2023-11-27 NOTE — PROGRESS NOTES
Subjective     Irina Kennedy is a 58 y.o. female who presents for the following: Skin Check (Face only. Right Latter day, Nose, and Upper Lip.).     Review of Systems:  No other skin or systemic complaints other than what is documented elsewhere in the note.    The following portions of the chart were reviewed this encounter and updated as appropriate:          Skin Cancer History  No skin cancer on file.      Specialty Problems          Dermatology Problems    Rash        Objective   Well appearing patient in no apparent distress; mood and affect are within normal limits.    A focused skin examination was performed. All findings within normal limits unless otherwise noted below.    Assessment/Plan   1. Sebaceous hyperplasia of face (6)  Glabella; Left Latter day; Right Latter day; Left Zygomatic Area; Right Parotid Area; Left Malar Cheek    2. Milia (2)  Left Root of Nose; Right Zygomatic Area    3. Multiple benign nevi (3)  Left Upper Cutaneous Lip; Left Lower Cutaneous Lip; Generalized        If patient is bothered by the little cyst onher right lateralnasalroot Ican remove it for her, would need surgical appointment

## 2023-12-21 ENCOUNTER — TELEMEDICINE (OUTPATIENT)
Dept: UROLOGY | Facility: CLINIC | Age: 58
End: 2023-12-21
Payer: COMMERCIAL

## 2023-12-21 DIAGNOSIS — N32.81 OAB (OVERACTIVE BLADDER): Primary | ICD-10-CM

## 2023-12-21 PROCEDURE — 99214 OFFICE O/P EST MOD 30 MIN: CPT | Performed by: STUDENT IN AN ORGANIZED HEALTH CARE EDUCATION/TRAINING PROGRAM

## 2023-12-21 NOTE — PROGRESS NOTES
CHIEF COMPLAINT: Virtual FUV           HISTORY OF PRESENT ILLNESS:  This is a 58 y.o. female,  who presents with a virtual follow up visit. Patient is doing well. Patient reports her bladder symptoms have started to return again. Patient's last Botox treatment was in September. She is ready for her next appointment.                HISTORY OF PRESENT ILLNESS:           Past Medical History  She has a past medical history of Personal history of other diseases of the circulatory system and Personal history of other diseases of the nervous system and sense organs (2013).    Surgical History  She has a past surgical history that includes  section, classic (2013); Tonsillectomy (2013); Sinus surgery (2013); Cholecystectomy (08/15/2020); Gastric restriction surgery (08/15/2020); Other surgical history (2019); Rotator cuff repair (2016); Other surgical history (08/15/2020); Other surgical history (2017); and Other surgical history (2014).     Social History  She reports that she has never smoked. She has never used smokeless tobacco. She reports current alcohol use of about 2.0 standard drinks of alcohol per week. She reports that she does not use drugs.    Family History  No family history on file.     Allergies  Heparin; Heparin analogues; Nsaids (non-steroidal anti-inflammatory drug); Quaternary ammonium cmpds,c12-c16 alkyldimethyl,cl; and Penicillins      A comprehensive 10+ review of systems was negative except for: see hpi                     Assessment:    58-year-old status post sling 2021 with persistent urinary urgency     UUI:   has failed myrbetriq and oxybutynin  s/p botox 100 units 3/31/2022, 22  s/p Botox, 6/15/2023, 100# units, not working as well as before  s/p botox, 100 units, 2023      Symptoms starting to return, will do 200 units next, plan q6 months             Kodak Culver MD

## 2024-01-19 ENCOUNTER — TELEMEDICINE (OUTPATIENT)
Dept: PRIMARY CARE | Facility: CLINIC | Age: 59
End: 2024-01-19
Payer: COMMERCIAL

## 2024-01-19 DIAGNOSIS — J06.9 UPPER RESPIRATORY TRACT INFECTION, UNSPECIFIED TYPE: ICD-10-CM

## 2024-01-19 DIAGNOSIS — R06.2 WHEEZING: Primary | ICD-10-CM

## 2024-01-19 PROCEDURE — 99443 PR PHYS/QHP TELEPHONE EVALUATION 21-30 MIN: CPT | Performed by: NURSE PRACTITIONER

## 2024-01-19 RX ORDER — ALBUTEROL SULFATE 0.83 MG/ML
2.5 SOLUTION RESPIRATORY (INHALATION) 4 TIMES DAILY PRN
Qty: 25 ML | Refills: 3 | Status: SHIPPED | OUTPATIENT
Start: 2024-01-19 | End: 2025-01-18

## 2024-01-19 RX ORDER — DOXYCYCLINE 100 MG/1
100 CAPSULE ORAL 2 TIMES DAILY
Qty: 14 CAPSULE | Refills: 0 | Status: SHIPPED | OUTPATIENT
Start: 2024-01-19 | End: 2024-01-26

## 2024-01-19 RX ORDER — ALBUTEROL SULFATE 90 UG/1
2 AEROSOL, METERED RESPIRATORY (INHALATION) EVERY 4 HOURS PRN
Qty: 8 G | Refills: 1 | Status: SHIPPED | OUTPATIENT
Start: 2024-01-19 | End: 2024-04-19

## 2024-01-19 RX ORDER — BENZONATATE 200 MG/1
200 CAPSULE ORAL 3 TIMES DAILY PRN
Qty: 42 CAPSULE | Refills: 0 | Status: SHIPPED | OUTPATIENT
Start: 2024-01-19 | End: 2024-02-18

## 2024-01-19 ASSESSMENT — ENCOUNTER SYMPTOMS
SINUS PRESSURE: 1
RHINORRHEA: 1
SORE THROAT: 1
COUGH: 1
WHEEZING: 1

## 2024-01-19 NOTE — PROGRESS NOTES
Subjective   Patient ID: Irina Kennedy is a 58 y.o. female who presents for sinus issues.      HPI   Onset: Monday  Symptoms: headache, congestion, runny nose, cough non production  Sore throat: yes  N/V/D: no  Fever: no  OTC medications: sinus medication  COVID Test: no      Review of Systems   HENT:  Positive for congestion, rhinorrhea, sinus pressure and sore throat.    Respiratory:  Positive for cough and wheezing.        Objective   There were no vitals taken for this visit.    Physical Exam  Constitutional:       Appearance: Normal appearance.   Pulmonary:      Effort: Pulmonary effort is normal.   Neurological:      Mental Status: She is alert.   Psychiatric:         Mood and Affect: Mood normal.         Behavior: Behavior normal.         Thought Content: Thought content normal.     Sound congested, cough, hoarse    Assessment/Plan   Problem List Items Addressed This Visit             ICD-10-CM    Upper respiratory tract infection J06.9     Start Doxycycline 100 mg twice daily for 7 days  Start albuterol aerosols as directed before bed as needed  Start albuterol inhaler as directed for wheezing  Start tessalon pearls as needed for cough  Rest and stay hydrated           Relevant Medications    albuterol 2.5 mg /3 mL (0.083 %) nebulizer solution    albuterol (Ventolin HFA) 90 mcg/actuation inhaler    benzonatate (Tessalon) 200 mg capsule    doxycycline (Vibramycin) 100 mg capsule    Wheezing - Primary R06.2     Albuterol as directed         Relevant Medications    albuterol 2.5 mg /3 mL (0.083 %) nebulizer solution    albuterol (Ventolin HFA) 90 mcg/actuation inhaler

## 2024-01-19 NOTE — ASSESSMENT & PLAN NOTE
Start Doxycycline 100 mg twice daily for 7 days  Start albuterol aerosols as directed before bed as needed  Start albuterol inhaler as directed for wheezing  Start tessalon pearls as needed for cough  Rest and stay hydrated

## 2024-02-08 ENCOUNTER — PROCEDURE VISIT (OUTPATIENT)
Dept: UROLOGY | Facility: CLINIC | Age: 59
End: 2024-02-08
Payer: COMMERCIAL

## 2024-02-08 DIAGNOSIS — N32.81 OAB (OVERACTIVE BLADDER): Primary | ICD-10-CM

## 2024-02-08 LAB
POC APPEARANCE, URINE: CLEAR
POC BILIRUBIN, URINE: NEGATIVE
POC BLOOD, URINE: NEGATIVE
POC COLOR, URINE: YELLOW
POC GLUCOSE, URINE: NEGATIVE MG/DL
POC KETONES, URINE: NEGATIVE MG/DL
POC LEUKOCYTES, URINE: NEGATIVE
POC NITRITE,URINE: NEGATIVE
POC PH, URINE: 5.5 PH
POC PROTEIN, URINE: NEGATIVE MG/DL
POC SPECIFIC GRAVITY, URINE: 1.01
POC UROBILINOGEN, URINE: 0.2 EU/DL

## 2024-02-08 PROCEDURE — 52287 CYSTOSCOPY CHEMODENERVATION: CPT | Performed by: STUDENT IN AN ORGANIZED HEALTH CARE EDUCATION/TRAINING PROGRAM

## 2024-02-08 RX ORDER — LIDOCAINE HYDROCHLORIDE 10 MG/ML
50 INJECTION INFILTRATION; PERINEURAL ONCE
Status: COMPLETED | OUTPATIENT
Start: 2024-02-08 | End: 2024-02-08

## 2024-02-08 RX ORDER — SODIUM BICARBONATE 1 MEQ/ML
5 SYRINGE (ML) INTRAVENOUS ONCE
Status: COMPLETED | OUTPATIENT
Start: 2024-02-08 | End: 2024-02-08

## 2024-02-08 RX ORDER — NITROFURANTOIN 25; 75 MG/1; MG/1
100 CAPSULE ORAL 2 TIMES DAILY
Qty: 6 CAPSULE | Refills: 0 | Status: SHIPPED | OUTPATIENT
Start: 2024-02-08 | End: 2024-02-11

## 2024-02-08 RX ADMIN — Medication 5 MEQ: at 16:46

## 2024-02-08 RX ADMIN — LIDOCAINE HYDROCHLORIDE 500 MG: 10 INJECTION INFILTRATION; PERINEURAL at 16:45

## 2024-02-08 NOTE — PROGRESS NOTES
"CHIEF COMPLAINT: Botox injection         HISTORY OF PRESENT ILLNESS:  Irina Kennedy is a 60 y/o female presenting on 24 for a botox 100 units injection.          Past Medical History  She has a past medical history of Personal history of other diseases of the circulatory system and Personal history of other diseases of the nervous system and sense organs (2013).    Surgical History  She has a past surgical history that includes  section, classic (2013); Tonsillectomy (2013); Sinus surgery (2013); Cholecystectomy (08/15/2020); Gastric restriction surgery (08/15/2020); Other surgical history (2019); Rotator cuff repair (2016); Other surgical history (08/15/2020); Other surgical history (2017); and Other surgical history (2014).     Social History  She reports that she has never smoked. She has never used smokeless tobacco. She reports current alcohol use of about 2.0 standard drinks of alcohol per week. She reports that she does not use drugs.    Family History  No family history on file.     Allergies  Heparin; Heparin analogues; Nsaids (non-steroidal anti-inflammatory drug); Quaternary ammonium cmpds,c12-c16 alkyldimethyl,cl; and Penicillins      A comprehensive 10+ review of systems was negative except for: see hpi                          PHYSICAL EXAMINATION:  BP Readings from Last 3 Encounters:   10/06/23 104/72   23 105/72   23 122/60      Wt Readings from Last 3 Encounters:   10/06/23 103 kg (228 lb)   23 103 kg (227 lb)   23 102 kg (225 lb)      BMI: Estimated body mass index is 44.9 kg/m² as calculated from the following:    Height as of 10/6/23: 1.518 m (4' 11.75\").    Weight as of 10/6/23: 103 kg (228 lb).  BSA: Estimated body surface area is 2.08 meters squared as calculated from the following:    Height as of 10/6/23: 1.518 m (4' 11.75\").    Weight as of 10/6/23: 103 kg (228 lb).  HEENT: Normocephalic, atraumatic, PER " EOMI, nonicteric, trachea normal, thyroid normal, oropharynx normal.  CARDIAC: regular rate & rhythm, S1 & S2 normal.  No heaves, thrills, gallops or murmurs.  LUNGS: Clear to auscultation, no spinal or CV tenderness.  EXTREMITIES: No evidence of cyanosis, clubbing or edema.      Procedure Note:  Botox 100 units    Botox Injection    Irina came today for Botox injection.  I reviewed with her the risks and benefits including ptosis, infection, and bleeding. She has no contraindications. She is on no antibiotics and has no neuromuscular disorders.  Pregnancy is not an issue.     She tolerated the procedure well.  The patient  will return to see me again in three to four months, earlier if there are any problems.     Irina understands the side effects and risks, as well as the necessity for continued treatment to maintain improvement.  Additional therapy may be necessary. Call if there are any problems. See diagram for injection sites and dosages. 100 units were used at a concentration of 10 units per 0.1 mL.          Provider Impression:  59-year-old status post sling October 2021 with persistent urinary urgency     UUI:   has failed myrbetriq and oxybutynin  s/p botox 100 units 3/31/2022, 9/8/22, 6/15/2023, 9/5/2023   100# units, not working as well as before  200 units on 2/8/24      Follow up virtually in 4-6 weeks    Kodak Culver MD    By signing my name below, IJohnny Scribe   attest that this documentation has been prepared under the direction and in the presence of Dr. Kodak Culver.

## 2024-03-12 DIAGNOSIS — N32.81 OAB (OVERACTIVE BLADDER): Primary | ICD-10-CM

## 2024-03-13 ENCOUNTER — LAB (OUTPATIENT)
Dept: LAB | Facility: LAB | Age: 59
End: 2024-03-13
Payer: COMMERCIAL

## 2024-03-13 DIAGNOSIS — N32.81 OAB (OVERACTIVE BLADDER): ICD-10-CM

## 2024-03-13 DIAGNOSIS — N39.0 RECURRENT UTI: ICD-10-CM

## 2024-03-13 DIAGNOSIS — N32.81 OAB (OVERACTIVE BLADDER): Primary | ICD-10-CM

## 2024-03-13 PROCEDURE — 87086 URINE CULTURE/COLONY COUNT: CPT

## 2024-03-13 PROCEDURE — 87186 SC STD MICRODIL/AGAR DIL: CPT

## 2024-03-14 ENCOUNTER — TELEMEDICINE (OUTPATIENT)
Dept: UROLOGY | Facility: CLINIC | Age: 59
End: 2024-03-14
Payer: COMMERCIAL

## 2024-03-14 ENCOUNTER — APPOINTMENT (OUTPATIENT)
Dept: UROLOGY | Facility: CLINIC | Age: 59
End: 2024-03-14
Payer: COMMERCIAL

## 2024-03-14 DIAGNOSIS — N32.81 OAB (OVERACTIVE BLADDER): Primary | ICD-10-CM

## 2024-03-14 DIAGNOSIS — N39.3 SUI (STRESS URINARY INCONTINENCE, FEMALE): ICD-10-CM

## 2024-03-14 PROCEDURE — 99213 OFFICE O/P EST LOW 20 MIN: CPT | Performed by: STUDENT IN AN ORGANIZED HEALTH CARE EDUCATION/TRAINING PROGRAM

## 2024-03-14 PROCEDURE — 1036F TOBACCO NON-USER: CPT | Performed by: STUDENT IN AN ORGANIZED HEALTH CARE EDUCATION/TRAINING PROGRAM

## 2024-03-14 RX ORDER — SULFAMETHOXAZOLE AND TRIMETHOPRIM 800; 160 MG/1; MG/1
1 TABLET ORAL 2 TIMES DAILY
Qty: 10 TABLET | Refills: 0 | Status: SHIPPED | OUTPATIENT
Start: 2024-03-14 | End: 2024-03-19

## 2024-03-14 NOTE — PROGRESS NOTES
HISTORY OF PRESENT ILLNESS:  Irina is a 60 yo female who presents today for a virtual follow up visit. She is status post botox injection 200 units on 24. She reports she is having symptoms and recently did a urine culture. She states her symptoms are terrible and started around 24. She is able to empty her bladder, but constantly has to go. She thought she had kidney stones.          Past Medical History  She has a past medical history of Personal history of other diseases of the circulatory system and Personal history of other diseases of the nervous system and sense organs (2013).    Surgical History  She has a past surgical history that includes  section, classic (2013); Tonsillectomy (2013); Sinus surgery (2013); Cholecystectomy (08/15/2020); Gastric restriction surgery (08/15/2020); Other surgical history (2019); Rotator cuff repair (2016); Other surgical history (08/15/2020); Other surgical history (2017); and Other surgical history (2014).     Social History  She reports that she has never smoked. She has never used smokeless tobacco. She reports current alcohol use of about 2.0 standard drinks of alcohol per week. She reports that she does not use drugs.    Family History  No family history on file.     Allergies  Heparin; Heparin analogues; Nsaids (non-steroidal anti-inflammatory drug); Quaternary ammonium cmpds,c12-c16 alkyldimethyl,cl; and Penicillins      A comprehensive 10+ review of systems was negative except for: see hpi          Assessment:  59-year-old status post sling 2021 with persistent urinary urgency     UUI:   has failed myrbetriq and oxybutynin  s/p botox 100 units 3/31/2022, 22, 6/15/2023, 2023  100# units, not working as well as before  200 units on 24, working well before infection  Rx Bactrim for infection     Follow up 6 weeks    Kodak Culver MD  Scribe Attestation  By signing my name below,  I, Paulo Harris   attest that this documentation has been prepared under the direction and in the presence of Kodak Culver MD.

## 2024-03-16 LAB — BACTERIA UR CULT: ABNORMAL

## 2024-04-18 ENCOUNTER — OFFICE VISIT (OUTPATIENT)
Dept: ORTHOPEDIC SURGERY | Facility: HOSPITAL | Age: 59
End: 2024-04-18
Payer: COMMERCIAL

## 2024-04-18 ENCOUNTER — HOSPITAL ENCOUNTER (OUTPATIENT)
Dept: RADIOLOGY | Facility: HOSPITAL | Age: 59
Discharge: HOME | End: 2024-04-18
Payer: COMMERCIAL

## 2024-04-18 ENCOUNTER — TELEPHONE (OUTPATIENT)
Dept: ORTHOPEDIC SURGERY | Facility: HOSPITAL | Age: 59
End: 2024-04-18

## 2024-04-18 DIAGNOSIS — M79.641 RIGHT HAND PAIN: ICD-10-CM

## 2024-04-18 DIAGNOSIS — M25.511 RIGHT SHOULDER PAIN, UNSPECIFIED CHRONICITY: ICD-10-CM

## 2024-04-18 DIAGNOSIS — G54.2 CERVICAL NERVE ROOT IMPINGEMENT: Primary | ICD-10-CM

## 2024-04-18 DIAGNOSIS — M19.031 OSTEOARTHRITIS OF RIGHT WRIST, UNSPECIFIED OSTEOARTHRITIS TYPE: ICD-10-CM

## 2024-04-18 PROCEDURE — G0463 HOSPITAL OUTPT CLINIC VISIT: HCPCS | Performed by: ORTHOPAEDIC SURGERY

## 2024-04-18 PROCEDURE — 73030 X-RAY EXAM OF SHOULDER: CPT | Mod: RIGHT SIDE | Performed by: RADIOLOGY

## 2024-04-18 PROCEDURE — 99213 OFFICE O/P EST LOW 20 MIN: CPT | Performed by: ORTHOPAEDIC SURGERY

## 2024-04-18 PROCEDURE — 20606 DRAIN/INJ JOINT/BURSA W/US: CPT | Performed by: ORTHOPAEDIC SURGERY

## 2024-04-18 PROCEDURE — 2500000004 HC RX 250 GENERAL PHARMACY W/ HCPCS (ALT 636 FOR OP/ED): Performed by: ORTHOPAEDIC SURGERY

## 2024-04-18 PROCEDURE — 73130 X-RAY EXAM OF HAND: CPT | Mod: RIGHT SIDE | Performed by: RADIOLOGY

## 2024-04-18 PROCEDURE — 1036F TOBACCO NON-USER: CPT | Performed by: ORTHOPAEDIC SURGERY

## 2024-04-18 PROCEDURE — 73130 X-RAY EXAM OF HAND: CPT | Mod: RT

## 2024-04-18 PROCEDURE — 73030 X-RAY EXAM OF SHOULDER: CPT | Mod: RT

## 2024-04-18 PROCEDURE — 2500000005 HC RX 250 GENERAL PHARMACY W/O HCPCS: Performed by: ORTHOPAEDIC SURGERY

## 2024-04-18 RX ORDER — METHYLPREDNISOLONE ACETATE 40 MG/ML
20 INJECTION, SUSPENSION INTRA-ARTICULAR; INTRALESIONAL; INTRAMUSCULAR; SOFT TISSUE
Status: COMPLETED | OUTPATIENT
Start: 2024-04-18 | End: 2024-04-18

## 2024-04-18 RX ORDER — METHYLPREDNISOLONE 4 MG/1
4 TABLET ORAL ONCE
Qty: 1 TABLET | Refills: 0 | Status: SHIPPED | OUTPATIENT
Start: 2024-04-18 | End: 2024-04-18

## 2024-04-18 RX ORDER — LIDOCAINE HYDROCHLORIDE 10 MG/ML
1 INJECTION INFILTRATION; PERINEURAL
Status: COMPLETED | OUTPATIENT
Start: 2024-04-18 | End: 2024-04-18

## 2024-04-18 RX ADMIN — LIDOCAINE HYDROCHLORIDE 1 ML: 10 INJECTION, SOLUTION INFILTRATION; PERINEURAL at 11:20

## 2024-04-18 RX ADMIN — METHYLPREDNISOLONE ACETATE 20 MG: 40 INJECTION, SUSPENSION INTRA-ARTICULAR; INTRALESIONAL; INTRAMUSCULAR; INTRASYNOVIAL; SOFT TISSUE at 11:20

## 2024-04-18 ASSESSMENT — ENCOUNTER SYMPTOMS
EYE DISCHARGE: 0
SHORTNESS OF BREATH: 0
JOINT SWELLING: 0
WOUND: 0
WHEEZING: 0
TROUBLE SWALLOWING: 0
CHILLS: 0
FEVER: 0

## 2024-04-18 ASSESSMENT — PAIN SCALES - GENERAL: PAINLEVEL_OUTOF10: 6

## 2024-04-18 ASSESSMENT — PAIN - FUNCTIONAL ASSESSMENT: PAIN_FUNCTIONAL_ASSESSMENT: 0-10

## 2024-04-18 NOTE — TELEPHONE ENCOUNTER
[facility] has a question regarding a prescription. You can contact Marcelina at [phone]

## 2024-04-18 NOTE — PROGRESS NOTES
Reason for Appointment  Chief Complaint   Patient presents with    Right Shoulder - Pain    Right Hand - Pain     History of Present Illness  Patient is a 59 y.o. female here today for follow-up evaluation of recurrent right shoulder and right hand pain.  She has a history of a previous rotator cuff repair done on that shoulder back in .  She was lifting some furniture a few weeks ago and has had increased pain over the posterior aspect of the shoulder and shoulder blade region since that time.  Pain is worse when she is typing at a keyboard.  Strays taken today of the shoulder show anchors in the humeral head and a biceps tenodesis button but minimal joint arthritis.  No other changes in her past medical history, allergies, or medications.    Past Medical History:   Diagnosis Date    Personal history of other diseases of the circulatory system     History of varicose veins    Personal history of other diseases of the nervous system and sense organs 2013    History of carpal tunnel syndrome       Past Surgical History:   Procedure Laterality Date     SECTION, CLASSIC  2013     Section    CHOLECYSTECTOMY  08/15/2020    Cholecystectomy    GASTRIC RESTRICTION SURGERY  08/15/2020    Gastric Surgery For Morbid Obesity    OTHER SURGICAL HISTORY  2019    Colonoscopy    OTHER SURGICAL HISTORY  08/15/2020    Abdominoplasty    OTHER SURGICAL HISTORY  2017    Corticosteroids Injection Intraarticular Left Knee    OTHER SURGICAL HISTORY  2014    Wrist Surgery Left    ROTATOR CUFF REPAIR  2016    Rotator Cuff Repair    SINUS SURGERY  2013    Sinus Surgery    TONSILLECTOMY  2013    Tonsillectomy       Medication Documentation Review Audit       Reviewed by Kim Shukla MA (Medical Assistant) on 24 at 1031      Medication Order Taking? Sig Documenting Provider Last Dose Status   albuterol (Ventolin HFA) 90 mcg/actuation inhaler [id number]  Inhale 2 puffs  every 4 hours if needed for wheezing or shortness of breath. CARLOS EDUARDO Damon   24 2359   albuterol 2.5 mg /3 mL (0.083 %) nebulizer solution [id number] Yes Take 3 mL (2.5 mg) by nebulization 4 times a day as needed for wheezing or shortness of breath. Alessandra Levin APRJEREMY-CNP Taking Active   buPROPion XL (Wellbutrin XL) 150 mg 24 hr tablet [id number] Yes Take 1 tablet (150 mg) by mouth once daily in the morning. Do not crush, chew, or split. Alessandra Levin APRJEREMY-CNP Taking Active   estradiol (Estrace) 0.01 % (0.1 mg/gram) vaginal cream [id number] Yes Insert 4 g into the vagina once daily. Alessandra Levin APRJEREMY-CNP Taking Active   fluticasone (Flonase) 50 mcg/actuation nasal spray [id number] Yes Administer 2 sprays into each nostril once daily. Alessandra LevinRUDI-CNP Taking Active   furosemide (Lasix) 40 mg tablet [id number] Yes Take 1 tablet (40 mg) by mouth once daily. Alessandra Levin RUDI-CNP Taking Active   loratadine (Claritin) 10 mg tablet [id number] Yes Take 1 tablet (10 mg) by mouth once daily. Historical Provider, MD Taking Active   mv-calcium-min-iron fm-FA-vitK (Multi For Her) 18 mg iron-600 mcg-80 mcg tablet [id number] Yes Take by mouth. Historical Provider, MD Taking Active   Ventolin HFA 90 mcg/actuation inhaler [id number] Yes Inhale 2 puffs every 4 hours if needed for wheezing. Historical Provider, MD Taking Active                    Allergies   Allergen Reactions    Heparin Hives    Heparin Analogues Hives    Nsaids (Non-Steroidal Anti-Inflammatory Drug) Other     had bariatric surgery    Quaternary Ammonium Cmpds,C12-C16 Alkyldimethyl,Cl Hives     quaternary- found in sanitizers/ disinfectives    Penicillins Hives, Rash and Unknown       Review of Systems   Constitutional:  Negative for chills and fever.   HENT:  Negative for mouth sores and trouble swallowing.    Eyes:  Negative for discharge.   Respiratory:  Negative for shortness of breath and wheezing.     Cardiovascular:  Negative for chest pain.   Musculoskeletal:  Negative for joint swelling.   Skin:  Negative for rash and wound.   All other systems reviewed and are negative.    Exam   On exam the right shoulder shows good active forward flexion over 150 degrees.  She has good cuff strength with resisted external rotation.  Tenderness over the right trapezius and right medial scapular border.  Some stiffness with neck motion.  Deltoid is functional.  No significant numbness in the fingers, negative Tinel's over the right median and ulnar nerves.  She does have tenderness over the right CMC joint.  Good digital motion otherwise with no triggering.  Good pulses and sensation in the upper extremity.    Assessment   Encounter Diagnoses   Name Primary?    Right shoulder pain, unspecified chronicity     Right hand pain    Cervical radiculopathy  Right wrist osteoarthritis    Plan   She would like another injection today into the right thumb, this has helped her in the past.  We sterilely injected under ultrasound guidance Depo-Medrol lidocaine into the right thumb CMC joint.  Patient understands the small risk of infection and the signs to look for as well as flare reaction.  Hopefully this gives her good relief.  She is also having cervical impingement symptoms, we discussed oral steroids to calm down her symptoms and if these do not help, she should follow-up with pain management for further evaluation.  She can follow-up with us as needed.    M Inj/Asp: R radiocarpal (R CMC) on 4/18/2024 11:20 AM  Indications: pain  Details: 25 G needle, ultrasound-guided  Medications: 1 mL lidocaine 10 mg/mL (1 %); 20 mg methylPREDNISolone acetate 40 mg/mL  Outcome: tolerated well, no immediate complications    After discussing the risks and benefits of the procedure we proceeded with the injection. Using ultrasound guidance we obtained images of the thumb CMC joint and subsequently we sterilely injected a mixture of 20 mg of  DepoMedrol and .5 cc of 1% lidocaine into the right CMC joint. Pt tolerated the procedure well without any adverse effects.   Procedure, treatment alternatives, risks and benefits explained, specific risks discussed. Consent was given by the patient. Immediately prior to procedure a time out was called to verify the correct patient, procedure, equipment, support staff and site/side marked as required. Patient was prepped and draped in the usual sterile fashion.       Written by Christine Hsieh saw, evaluated, and treated the patient with the PA

## 2024-04-18 NOTE — PROGRESS NOTES
Subjective   Patient ID: Irina Kennedy is a 59 y.o. female who presents for Annual Exam.    PAP 10-11-18 NEG HPV NEG, 8-31-17 NEG, 5-6-16 NEG HPV NEG, 1-23-15 LSIL HPV POS, Colpo 2-23-15 LSIL  MAMMO 6-28-23  DEXA NEVER  COLON 10-24-18 polyp back in 5 years. Dad passed colon ca. Did Cologuard 6-27-23       Last seen 2021. Hurt shoulder moving her mom. Starting steroids. Mom is 88, has bone, brain and breast cancer.    Has lost some hair.    Vomiting after meals. Had endoscopy, hiatal hernia. Had surgery. Got massive infection** with slow recovery.  She is post-menopausal, menopause reached at age 50. [facility] chisholm at [employer]. Daughter doing well.  Menopausal symptoms include hot flashes. She reports they are manageable and not affecting her daily living.   Denies any post-menopausal vaginal bleeding.  She denies any breast changes.   She is currently sexually active. Reports dyspareunia due to vaginal dryness. Tried Imvexxy last time. .  Denies abnormal vaginal discharge, itching, or odor.  Denies pelvic pain.   Saw Dr. Culver for mixed incontinence. Doing Botox with Dr. Culver. Does every 3 months.  Denies bowel concerns.  She is a non-smoker.   Medical hx. significant for bariatric surgery in 2004, cholecystectomy in 1994, depression, arthritis.  Denies significant family history of breast, ovarian cancers.  Family h/o colon cancer -dad, recently diagnosed.  Working part time and interviewing.       Review of Systems   Constitutional:  Positive for fatigue.   Skin:         Hair loss   Hematological:  Bruises/bleeds easily.       Objective   Constitutional: Alert and in no acute distress. Well developed, well nourished.  Neck: no neck asymmetry. Supple and thyroid not enlarged and there were no palpable thyroid nodules.  Chest: Breasts normal appearance, no nipple discharge and no skin changes and palpation of breasts and axillae: no palpable mass and no axillary lymphadenopathy.  Abdomen: soft  nontender; no abdominal mass palpated, no organomegaly and no hernias.  Genitourinary: external genitalia: normal, no inguinal lymphadenopathy, Bartholin's urethral and [place]'s glands: normal, urethra: normal and bladder: normal on palpation.  Vagina: normal.  Cervix: normal.  Uterus: normal.   Right adnexa/parametria: normal.  Left adnexa/parametria: normal.  Skin: normal skin color and pigmentation, normal skin turgor and no rash.  Psychiatric: alert and oriented x 3, affect normal to patient baseline and mood appropriate.     Assessment/Plan   -Pap-HPV  -Patient needs colonoscopy for polyp. Confirmed in chart.  -[id number]-[id number]  -Follow up Derm hair loss.

## 2024-04-19 ENCOUNTER — OFFICE VISIT (OUTPATIENT)
Dept: OBSTETRICS AND GYNECOLOGY | Facility: CLINIC | Age: 59
End: 2024-04-19
Payer: COMMERCIAL

## 2024-04-19 VITALS
DIASTOLIC BLOOD PRESSURE: 82 MMHG | HEIGHT: 62 IN | BODY MASS INDEX: 41.88 KG/M2 | SYSTOLIC BLOOD PRESSURE: 120 MMHG | WEIGHT: 227.6 LBS

## 2024-04-19 DIAGNOSIS — Z12.31 ENCOUNTER FOR SCREENING MAMMOGRAM FOR BREAST CANCER: Primary | ICD-10-CM

## 2024-04-19 DIAGNOSIS — Z12.4 CERVICAL CANCER SCREENING: ICD-10-CM

## 2024-04-19 DIAGNOSIS — N89.8 VAGINAL DRYNESS: ICD-10-CM

## 2024-04-19 DIAGNOSIS — Z12.11 ENCOUNTER FOR SCREENING COLONOSCOPY: ICD-10-CM

## 2024-04-19 DIAGNOSIS — Z01.419 WELL WOMAN EXAM WITH ROUTINE GYNECOLOGICAL EXAM: ICD-10-CM

## 2024-04-19 PROCEDURE — 87624 HPV HI-RISK TYP POOLED RSLT: CPT

## 2024-04-19 PROCEDURE — 99396 PREV VISIT EST AGE 40-64: CPT | Performed by: OBSTETRICS & GYNECOLOGY

## 2024-04-19 PROCEDURE — 88175 CYTOPATH C/V AUTO FLUID REDO: CPT

## 2024-04-19 PROCEDURE — 1036F TOBACCO NON-USER: CPT | Performed by: OBSTETRICS & GYNECOLOGY

## 2024-04-19 RX ORDER — ESTRADIOL 0.1 MG/G
4 CREAM VAGINAL DAILY
Qty: 42.5 G | Refills: 3 | Status: SHIPPED | OUTPATIENT
Start: 2024-04-19

## 2024-04-19 RX ORDER — CHOLECALCIFEROL (VITAMIN D3) 25 MCG
TABLET ORAL WEEKLY
COMMUNITY

## 2024-04-19 ASSESSMENT — ENCOUNTER SYMPTOMS
FATIGUE: 1
ROS SKIN COMMENTS: HAIR LOSS
BRUISES/BLEEDS EASILY: 1

## 2024-04-25 ENCOUNTER — APPOINTMENT (OUTPATIENT)
Dept: UROLOGY | Facility: CLINIC | Age: 59
End: 2024-04-25
Payer: COMMERCIAL

## 2024-05-06 DIAGNOSIS — Z12.11 COLON CANCER SCREENING: ICD-10-CM

## 2024-05-06 RX ORDER — POLYETHYLENE GLYCOL 3350, SODIUM CHLORIDE, SODIUM BICARBONATE, POTASSIUM CHLORIDE 420; 11.2; 5.72; 1.48 G/4L; G/4L; G/4L; G/4L
POWDER, FOR SOLUTION ORAL
Qty: 4000 ML | Refills: 0 | Status: SHIPPED | OUTPATIENT
Start: 2024-05-06

## 2024-06-15 DIAGNOSIS — F32.A DEPRESSION, UNSPECIFIED DEPRESSION TYPE: ICD-10-CM

## 2024-06-17 RX ORDER — BUPROPION HYDROCHLORIDE 150 MG/1
150 TABLET ORAL EVERY MORNING
Qty: 90 TABLET | Refills: 1 | Status: SHIPPED | OUTPATIENT
Start: 2024-06-17 | End: 2024-06-19 | Stop reason: SDUPTHER

## 2024-06-17 RX ORDER — BUPROPION HYDROCHLORIDE 150 MG/1
TABLET ORAL
Refills: 0 | OUTPATIENT
Start: 2024-06-17

## 2024-06-19 DIAGNOSIS — F32.A DEPRESSION, UNSPECIFIED DEPRESSION TYPE: ICD-10-CM

## 2024-06-19 RX ORDER — BUPROPION HYDROCHLORIDE 150 MG/1
150 TABLET ORAL EVERY MORNING
Qty: 30 TABLET | Refills: 0 | Status: SHIPPED | OUTPATIENT
Start: 2024-06-19 | End: 2024-06-21 | Stop reason: ALTCHOICE

## 2024-06-19 RX ORDER — BUPROPION HYDROCHLORIDE 150 MG/1
150 TABLET ORAL EVERY MORNING
Qty: 90 TABLET | Refills: 0 | Status: SHIPPED | OUTPATIENT
Start: 2024-06-19 | End: 2024-06-21 | Stop reason: SDUPTHER

## 2024-06-21 ENCOUNTER — APPOINTMENT (OUTPATIENT)
Dept: RADIOLOGY | Facility: HOSPITAL | Age: 59
End: 2024-06-21
Payer: COMMERCIAL

## 2024-06-21 ENCOUNTER — OFFICE VISIT (OUTPATIENT)
Dept: PRIMARY CARE | Facility: CLINIC | Age: 59
End: 2024-06-21
Payer: COMMERCIAL

## 2024-06-21 VITALS
DIASTOLIC BLOOD PRESSURE: 90 MMHG | SYSTOLIC BLOOD PRESSURE: 113 MMHG | HEART RATE: 92 BPM | WEIGHT: 226 LBS | BODY MASS INDEX: 42.67 KG/M2 | HEIGHT: 61 IN | OXYGEN SATURATION: 97 %

## 2024-06-21 DIAGNOSIS — Z98.84 GASTRIC BYPASS STATUS FOR OBESITY: ICD-10-CM

## 2024-06-21 DIAGNOSIS — D64.9 ANEMIA, UNSPECIFIED TYPE: ICD-10-CM

## 2024-06-21 DIAGNOSIS — F32.A DEPRESSION, UNSPECIFIED DEPRESSION TYPE: ICD-10-CM

## 2024-06-21 DIAGNOSIS — Z80.3 FAMILY HISTORY OF BREAST CANCER: ICD-10-CM

## 2024-06-21 DIAGNOSIS — G47.33 OBSTRUCTIVE SLEEP APNEA SYNDROME: ICD-10-CM

## 2024-06-21 DIAGNOSIS — R06.09 DOE (DYSPNEA ON EXERTION): Primary | ICD-10-CM

## 2024-06-21 PROBLEM — J06.9 UPPER RESPIRATORY TRACT INFECTION: Status: RESOLVED | Noted: 2023-08-03 | Resolved: 2024-06-21

## 2024-06-21 PROBLEM — J34.89 NASAL CONGESTION WITH RHINORRHEA: Status: RESOLVED | Noted: 2023-08-03 | Resolved: 2024-06-21

## 2024-06-21 PROBLEM — H69.91 DYSFUNCTION OF RIGHT EUSTACHIAN TUBE: Status: RESOLVED | Noted: 2023-08-03 | Resolved: 2024-06-21

## 2024-06-21 PROBLEM — J32.9 SINOBRONCHITIS: Status: RESOLVED | Noted: 2023-08-03 | Resolved: 2024-06-21

## 2024-06-21 PROBLEM — R09.81 NASAL CONGESTION WITH RHINORRHEA: Status: RESOLVED | Noted: 2023-08-03 | Resolved: 2024-06-21

## 2024-06-21 PROBLEM — R09.81 SINUS CONGESTION: Status: RESOLVED | Noted: 2023-08-03 | Resolved: 2024-06-21

## 2024-06-21 PROBLEM — J02.9 ACUTE PHARYNGITIS: Status: RESOLVED | Noted: 2023-08-03 | Resolved: 2024-06-21

## 2024-06-21 PROBLEM — H66.90 ACUTE OTITIS MEDIA: Status: RESOLVED | Noted: 2023-08-03 | Resolved: 2024-06-21

## 2024-06-21 PROBLEM — J40 SINOBRONCHITIS: Status: RESOLVED | Noted: 2023-08-03 | Resolved: 2024-06-21

## 2024-06-21 PROCEDURE — 1036F TOBACCO NON-USER: CPT | Performed by: INTERNAL MEDICINE

## 2024-06-21 PROCEDURE — 99214 OFFICE O/P EST MOD 30 MIN: CPT | Performed by: INTERNAL MEDICINE

## 2024-06-21 RX ORDER — BUPROPION HYDROCHLORIDE 300 MG/1
300 TABLET ORAL EVERY MORNING
Qty: 90 TABLET | Refills: 1 | Status: SHIPPED | OUTPATIENT
Start: 2024-06-21

## 2024-06-21 ASSESSMENT — PATIENT HEALTH QUESTIONNAIRE - PHQ9
SUM OF ALL RESPONSES TO PHQ9 QUESTIONS 1 AND 2: 0
2. FEELING DOWN, DEPRESSED OR HOPELESS: NOT AT ALL
1. LITTLE INTEREST OR PLEASURE IN DOING THINGS: NOT AT ALL

## 2024-06-21 NOTE — PROGRESS NOTES
Subjective   Patient ID: Irina Kennedy is a 59 y.o. y/o female who presents to with a chief complaint of No chief complaint on file..     HPI    New patient here to establish care   Former PCP: RUDI Gabriel      Reports MORALES for seveal months. Having trouble going up 2 flights of stairs. Gets winded after 1 mi of bike riding (used to ride over 4 miles). No CP or pressure. Maybe a little swelling in legs than usual. Has SÁNCHEZ , not using CPAP due it being broke. Has asthma but typically worse in the winter. Had covid over the winter     S/p  baratric surgery in 2004([facility])    Depression worse recently, would like to increase her Wellbutrin     Follows with Dr. Culver for UI- s/p Botox which has worked well.     No dysphagia or reflux   Bowels are regular, no melena or hematochezia   No LUTS       12 point review of systems negative unless otherwise noted in HPI             Objective   There were no vitals filed for this visit.    Physical Exam  Constitutional:       Appearance: Normal appearance.   HENT:      Head: Normocephalic and atraumatic.   Neck:      Vascular: No carotid bruit.   Cardiovascular:      Rate and Rhythm: Normal rate and regular rhythm.      Heart sounds: Normal heart sounds. No murmur heard.     No gallop.   Pulmonary:      Effort: Pulmonary effort is normal. No respiratory distress.      Breath sounds: No wheezing or rales.   Skin:     General: Skin is warm and dry.      Findings: No rash.   Neurological:      Mental Status: She is alert and oriented to person, place, and time. Mental status is at baseline.   Psychiatric:         Mood and Affect: Mood normal.         Behavior: Behavior normal.         Assessment/Plan   Problem List Items Addressed This Visit    #MORALES  -Untreasted SÁNCHEZ vs cardiac vs anemia vs other   -Check CBC, CMP,TSH, BNP  -Refer to sleep medicine for new CPAP  -Refer to cardiology for cardiac workup     #Depression   -Increase bupropion XL to 300mg daily     #Family hx of  breast ca  -Refer to genetics     #SÁNCHEZ  -Refer to sleep medicine as her CPAP does not work     #UUI  -Follows with Dr. Culver, s/p Botox       Influenza: UTD  COVID: x6  Prevnar 13: Never  Pneumovax 23: Never   Prevnar 20: Never  RSV  1/1024  Shingrix: Recommended   Mamm: 6/21/24- will be sent by [facility]   DEXA: Never  Pap: per Dr. Almodovar   Colorectal ca: 6/2028- ordered by dr. Almodovar   Lung ca screening: NI    RTC 6 mo

## 2024-06-21 NOTE — PATIENT INSTRUCTIONS
Sleep medicine [phone]  Cardiology [phone]  Genetics number on form   Please complete fasting blood work. Fast for 10 hours, black coffee and water the morning of labs are OK.   Increase Wellbutrin to 300mg XL daily     6 months

## 2024-06-22 ENCOUNTER — LAB (OUTPATIENT)
Dept: LAB | Facility: LAB | Age: 59
End: 2024-06-22
Payer: COMMERCIAL

## 2024-06-22 DIAGNOSIS — Z98.84 GASTRIC BYPASS STATUS FOR OBESITY: ICD-10-CM

## 2024-06-22 DIAGNOSIS — R06.09 DOE (DYSPNEA ON EXERTION): ICD-10-CM

## 2024-06-22 DIAGNOSIS — D64.9 ANEMIA, UNSPECIFIED TYPE: ICD-10-CM

## 2024-06-22 LAB
ALBUMIN SERPL BCP-MCNC: 3.9 G/DL (ref 3.4–5)
ALP SERPL-CCNC: 74 U/L (ref 33–136)
ALT SERPL W P-5'-P-CCNC: 14 U/L (ref 7–45)
ANION GAP SERPL CALC-SCNC: 13 MMOL/L (ref 10–20)
AST SERPL W P-5'-P-CCNC: 21 U/L (ref 9–39)
BILIRUB SERPL-MCNC: 1.2 MG/DL (ref 0–1.2)
BNP SERPL-MCNC: 14 PG/ML (ref 0–99)
BUN SERPL-MCNC: 14 MG/DL (ref 6–23)
CALCIUM SERPL-MCNC: 8.6 MG/DL (ref 8.6–10.3)
CHLORIDE SERPL-SCNC: 106 MMOL/L (ref 98–107)
CHOLEST SERPL-MCNC: 163 MG/DL (ref 0–199)
CHOLESTEROL/HDL RATIO: 4.9
CO2 SERPL-SCNC: 26 MMOL/L (ref 21–32)
CREAT SERPL-MCNC: 0.57 MG/DL (ref 0.5–1.05)
EGFRCR SERPLBLD CKD-EPI 2021: >90 ML/MIN/1.73M*2
ERYTHROCYTE [DISTWIDTH] IN BLOOD BY AUTOMATED COUNT: 16.1 % (ref 11.5–14.5)
FERRITIN SERPL-MCNC: 254 NG/ML (ref 8–150)
FOLATE SERPL-MCNC: 13.8 NG/ML
GLUCOSE SERPL-MCNC: 95 MG/DL (ref 74–99)
HCT VFR BLD AUTO: 34.5 % (ref 36–46)
HDLC SERPL-MCNC: 33.1 MG/DL
HGB BLD-MCNC: 10.8 G/DL (ref 12–16)
IRON SATN MFR SERPL: 14 % (ref 25–45)
IRON SERPL-MCNC: 57 UG/DL (ref 35–150)
LDLC SERPL CALC-MCNC: 109 MG/DL
MCH RBC QN AUTO: 25.2 PG (ref 26–34)
MCHC RBC AUTO-ENTMCNC: 31.3 G/DL (ref 32–36)
MCV RBC AUTO: 80 FL (ref 80–100)
NON HDL CHOLESTEROL: 130 MG/DL (ref 0–149)
NRBC BLD-RTO: 0 /100 WBCS (ref 0–0)
PLATELET # BLD AUTO: 224 X10*3/UL (ref 150–450)
POTASSIUM SERPL-SCNC: 4.2 MMOL/L (ref 3.5–5.3)
PROT SERPL-MCNC: 5.6 G/DL (ref 6.4–8.2)
RBC # BLD AUTO: 4.29 X10*6/UL (ref 4–5.2)
SODIUM SERPL-SCNC: 141 MMOL/L (ref 136–145)
TIBC SERPL-MCNC: 399 UG/DL (ref 240–445)
TRIGL SERPL-MCNC: 105 MG/DL (ref 0–149)
TSH SERPL-ACNC: 2 MIU/L (ref 0.44–3.98)
UIBC SERPL-MCNC: 342 UG/DL (ref 110–370)
VIT B12 SERPL-MCNC: 264 PG/ML (ref 211–911)
VLDL: 21 MG/DL (ref 0–40)
WBC # BLD AUTO: 3.5 X10*3/UL (ref 4.4–11.3)

## 2024-06-22 PROCEDURE — 83550 IRON BINDING TEST: CPT

## 2024-06-22 PROCEDURE — 80061 LIPID PANEL: CPT

## 2024-06-22 PROCEDURE — 83540 ASSAY OF IRON: CPT

## 2024-06-22 PROCEDURE — 80053 COMPREHEN METABOLIC PANEL: CPT

## 2024-06-22 PROCEDURE — 84443 ASSAY THYROID STIM HORMONE: CPT

## 2024-06-22 PROCEDURE — 36415 COLL VENOUS BLD VENIPUNCTURE: CPT

## 2024-06-22 PROCEDURE — 85027 COMPLETE CBC AUTOMATED: CPT

## 2024-06-22 PROCEDURE — 82728 ASSAY OF FERRITIN: CPT

## 2024-06-22 PROCEDURE — 83880 ASSAY OF NATRIURETIC PEPTIDE: CPT

## 2024-06-22 PROCEDURE — 82607 VITAMIN B-12: CPT

## 2024-06-22 PROCEDURE — 82746 ASSAY OF FOLIC ACID SERUM: CPT

## 2024-06-25 DIAGNOSIS — E53.8 B12 DEFICIENCY: Primary | ICD-10-CM

## 2024-06-25 DIAGNOSIS — D64.9 ANEMIA, UNSPECIFIED TYPE: ICD-10-CM

## 2024-06-25 RX ORDER — LANOLIN ALCOHOL/MO/W.PET/CERES
1000 CREAM (GRAM) TOPICAL DAILY
Qty: 90 TABLET | Refills: 1 | Status: SHIPPED | OUTPATIENT
Start: 2024-06-25 | End: 2025-06-25

## 2024-06-28 ENCOUNTER — APPOINTMENT (OUTPATIENT)
Dept: RADIOLOGY | Facility: HOSPITAL | Age: 59
End: 2024-06-28
Payer: COMMERCIAL

## 2024-07-12 ENCOUNTER — APPOINTMENT (OUTPATIENT)
Dept: GASTROENTEROLOGY | Facility: EXTERNAL LOCATION | Age: 59
End: 2024-07-12
Payer: COMMERCIAL

## 2024-07-12 ENCOUNTER — OFFICE VISIT (OUTPATIENT)
Dept: SLEEP MEDICINE | Facility: CLINIC | Age: 59
End: 2024-07-12
Payer: COMMERCIAL

## 2024-07-12 VITALS
DIASTOLIC BLOOD PRESSURE: 66 MMHG | SYSTOLIC BLOOD PRESSURE: 96 MMHG | HEIGHT: 61 IN | WEIGHT: 228 LBS | HEART RATE: 77 BPM | BODY MASS INDEX: 43.05 KG/M2 | RESPIRATION RATE: 18 BRPM | OXYGEN SATURATION: 96 %

## 2024-07-12 DIAGNOSIS — J30.9 ALLERGIC RHINITIS, UNSPECIFIED SEASONALITY, UNSPECIFIED TRIGGER: ICD-10-CM

## 2024-07-12 DIAGNOSIS — E66.01 MORBID OBESITY (MULTI): ICD-10-CM

## 2024-07-12 DIAGNOSIS — G47.33 OSA (OBSTRUCTIVE SLEEP APNEA): Primary | ICD-10-CM

## 2024-07-12 PROCEDURE — 99204 OFFICE O/P NEW MOD 45 MIN: CPT | Performed by: INTERNAL MEDICINE

## 2024-07-12 PROCEDURE — G2211 COMPLEX E/M VISIT ADD ON: HCPCS | Performed by: INTERNAL MEDICINE

## 2024-07-12 PROCEDURE — 1036F TOBACCO NON-USER: CPT | Performed by: INTERNAL MEDICINE

## 2024-07-12 PROCEDURE — 99214 OFFICE O/P EST MOD 30 MIN: CPT | Performed by: INTERNAL MEDICINE

## 2024-07-12 ASSESSMENT — SLEEP AND FATIGUE QUESTIONNAIRES
HOW LIKELY ARE YOU TO NOD OFF OR FALL ASLEEP WHILE SITTING QUIETLY AFTER LUNCH WITHOUT ALCOHOL: WOULD NEVER DOZE
SITING INACTIVE IN A PUBLIC PLACE LIKE A CLASS ROOM OR A MOVIE THEATER: WOULD NEVER DOZE
WAKING_TOO_EARLY: MODERATE
ESS-CHAD TOTAL SCORE: 12
SLEEP_PROBLEM_NOTICEABLE_TO_OTHERS: VERY MUCH NOTICEABLE
HOW LIKELY ARE YOU TO NOD OFF OR FALL ASLEEP WHILE SITTING AND TALKING TO SOMEONE: WOULD NEVER DOZE
HOW LIKELY ARE YOU TO NOD OFF OR FALL ASLEEP WHILE WATCHING TV: HIGH CHANCE OF DOZING
HOW LIKELY ARE YOU TO NOD OFF OR FALL ASLEEP WHEN YOU ARE A PASSENGER IN A CAR FOR AN HOUR WITHOUT A BREAK: HIGH CHANCE OF DOZING
HOW LIKELY ARE YOU TO NOD OFF OR FALL ASLEEP IN A CAR, WHILE STOPPED FOR A FEW MINUTES IN TRAFFIC: WOULD NEVER DOZE
HOW LIKELY ARE YOU TO NOD OFF OR FALL ASLEEP WHILE LYING DOWN TO REST IN THE AFTERNOON WHEN CIRCUMSTANCES PERMIT: HIGH CHANCE OF DOZING
SLEEP_PROBLEM_INTERFERES_DAILY_ACTIVITIES: VERY MUCH NOTICEABLE
WORRIED_DISTRESSED_DUE_TO_SLEEP: VERY MUCH NOTICEABLE
DIFFICULTY_STAYING_ASLEEP: MODERATE
SATISFACTION_WITH_CURRENT_SLEEP_PATTERN: VERY DISSATISFIED
HOW LIKELY ARE YOU TO NOD OFF OR FALL ASLEEP WHILE SITTING AND READING: HIGH CHANCE OF DOZING

## 2024-07-12 ASSESSMENT — PAIN SCALES - GENERAL: PAINLEVEL: 0-NO PAIN

## 2024-07-12 ASSESSMENT — PATIENT HEALTH QUESTIONNAIRE - PHQ9
SUM OF ALL RESPONSES TO PHQ9 QUESTIONS 1 AND 2: 0
1. LITTLE INTEREST OR PLEASURE IN DOING THINGS: NOT AT ALL
2. FEELING DOWN, DEPRESSED OR HOPELESS: NOT AT ALL

## 2024-07-12 NOTE — PROGRESS NOTES
[facility] SLEEP MEDICINE CLINIC  NEW CONSULT VISIT NOTE    PCP: João Bhakta DO  Referred by: João Bhakta DO    HISTORY OF PRESENT ILLNESS     Patient ID: Irina Kennedy is a 59 y.o. female who presents to [facility] for a comprehensive sleep medicine evaluation with concerns of sleep apnea on CPAP, needing new machine .    Patient is here alone today.  To review, patient's medical history is notable for morbid obesity (BMI 43), s/p gastric bypass in 2004, allergic rhinitis, asthma, depression, and SÁNCHEZ on CPAP.    Patient was diagnosed with SÁNCHEZ in 2001 by PSG and was started on CPAP since then. Patient states that she was on CPAP 12 cm H2O in the past and now down to CPAP 9 cm H2O after she lost 100 lbs from gastric bypass in 2004. Currently still on CPAP 9 cm H2O and  full face mask. She gets her supplies thru [facility] pharmacy.   Patient states that she has been using machine every night but felt that machine is not working properly as the pressure felt weaker than it used to be. She did not bring machine today in clinic.  Patient states that she is currently using her old Resmed machine (first CPAP machine) for more than 10 years. She also states that she had another Dreamstation machine more than 5 years ago which she mailed to the wrong place in 2021 or 2022 when it got recalled because she somehow got scammed. She has not gotten any replacement machine from [employer] at all.   Patient denies machine problems, mask leak, air hunger, aerophagia, and skin irritation. Complains of dry mouth and nasal congestion.   The following are patient's perceived benefits of PAP: decreased snoring / choking / gasping.    SLEEP STUDY HISTORY (personally reviewed raw data such as interpretation report, data sheet, hypnogram, and titration table if available and applicable)  Split night PSG 2/16/2001: pre-PAP AHI 24.7, SpO2 andrew 74%. CPAP was started. At CPAP 11 cm H2O, there are  significant decrease in apnea and hypopnea (nothing reported in the report though) and SpO2 andrew 93-94%  PAP titration 12/20/2004: CPAP was started at 5-6 cm H2O. RDI 4.9, REM RDI 11.9, SpO2 andrew 91%    SLEEP-WAKE SCHEDULE  Bedtime:  10:30 PM daily  Subjective sleep latency: 2 seconds  Difficulty falling asleep: No  Number of awakenings:  1-2 times per night spontaneously for unknown reasons without CPAP, sleeps thru the night on CPAP  Nocturia: No  Falls back asleep in 30-60 minutes, sometimes unable to fall back asleep  Final wake time:  6 AM daily  Out of bed time:  6 AM daily  Shift work: No  Naps: 2-3 times per week for 30 minutes  Feels rested after a nap: Yes  Average sleep duration (excluding naps): 5 hours without CPAP, 7 hours on CPAP     SLEEP ENVIRONMENT  Sleep location: bed  Sleep status: sleeps alone and sleeps with boyfriend sometimes  Preferred sleep position: side  TV in bedroom: yes but not turned on  Room is dark: Yes  Room is quiet: Yes  Room is cool: Yes    SLEEP HABITS  Smoking: no  ETOH: no  Marijuana: no  Caffeine: 1 cup coffee daily in the morning  Sleep aids: no    SLEEP ROS  Night symptoms: POSITIVE for snoring before PAP but not now, witnessed apnea before PAP but now , wake up gasping and/or choking for air before PAP but not now, nasal congestion , and mouth breathing and NEGATIVE for night sweats during sleep, waking up with racing heart, heartburn or sour taste in mouth at night, nocturnal cough, and nocturia  Morning symptoms: POSITIVE for unrefreshing sleep, morning headache, and morning dry mouth and NEGATIVE for morning sore throat  Daytime symptoms POSITIVE for excessive daytime sleepiness, fatigue, trouble remembering things in daytime, trouble staying focused in daytime, irritability in daytime, and drowsy driving and NEGATIVE for history of car accident due to drowsy driving and history of near-miss car accident due to drowsy driving  Hypersomnia / narcolepsy symptoms:  Patient denies symptoms of a hypersomnolence disorder such as sleep paralysis, sleep-related hallucinations, recurrent sleep attacks, automatic behaviors, and cataplexy.   Parasomnia symptoms: Patient denies symptoms of parasomnia such as sleepwalking, sleeptalking, sleep-eating, acting out dreams, and nightmares.   Movements in sleep: Patient denies problematic movements in sleep such as seizures during sleep, frequent leg kicks / jerks while asleep, and sleep-related bruxism.    RLS screen: Patient denies RLS symptoms.    WEIGHT: lost 100 lbs after Mario-en-Y in 2004, regained 18 lbs      Claustrophobia: No    REVIEW OF SYSTEMS     All other systems have been reviewed and are negative.    ALLERGIES     Allergies   Allergen Reactions    Heparin Hives    Heparin Analogues Hives    Nsaids (Non-Steroidal Anti-Inflammatory Drug) Other     had bariatric surgery    Quaternary Ammonium Cmpds,C12-C16 Alkyldimethyl,Cl Hives     quaternary- found in sanitizers/ disinfectives    Penicillins Hives, Rash and Unknown       MEDICATIONS     Current Outpatient Medications   Medication Sig Dispense Refill    albuterol (Ventolin HFA) 90 mcg/actuation inhaler Inhale 2 puffs every 4 hours if needed for wheezing or shortness of breath. 8 g 1    albuterol 2.5 mg /3 mL (0.083 %) nebulizer solution Take 3 mL (2.5 mg) by nebulization 4 times a day as needed for wheezing or shortness of breath. 25 mL 3    buPROPion XL (Wellbutrin XL) 300 mg 24 hr tablet Take 1 tablet (300 mg) by mouth once daily in the morning. Do not crush, chew, or split. 90 tablet 1    cholecalciferol (Vitamin D-3) 25 MCG (1000 UT) tablet Take by mouth once a week.      cyanocobalamin (Vitamin B-12) 1,000 mcg tablet Take 1 tablet (1,000 mcg) by mouth once daily. 90 tablet 1    estradiol (Estrace) 0.01 % (0.1 mg/gram) vaginal cream Insert 1 Applicatorful (4 g) into the vagina once daily. 42.5 g 3    fluticasone (Flonase) 50 mcg/actuation nasal spray Administer 2 sprays into  "each nostril once daily. 16 g 1    loratadine (Claritin) 10 mg tablet Take 1 tablet (10 mg) by mouth once daily.      mv-calcium-min-iron fm-FA-vitK (Multi For Her) 18 mg iron-600 mcg-80 mcg tablet Take by mouth.      polyethylene glycol-electrolytes (Nulytely) 420 gram solution Drink 1/2 starting at 6 pm the night before your procedure then drink the 2nd 1/2 5 hours before procedure arrival tme 4000 mL 0     No current facility-administered medications for this visit.       PAST HISTORIES     PERTINENT PAST MEDICAL HISTORY: See HPI    PERTINENT PAST SURGICAL HISTORY for Sleep Medicine:  tonsillectomy and adenoidectomy and uvulectomy    PERTINENT FAMILY HISTORY for Sleep Medicine:  loud snoring- father    PERTINENT SOCIAL HISTORY:  She  reports that she has never smoked. She has never used smokeless tobacco. She reports current alcohol use of about 2.0 standard drinks of alcohol per week. She reports that she does not use drugs. She currently lives alone and employed as     Active Problems, Allergy List, Medication List, and PMH/PSH/FH/Social Hx have been reviewed and reconciled in chart. No significant changes unless documented in the pertinent chart section. Updates made when necessary.     PHYSICAL EXAM     VITAL SIGNS: BP 96/66   Pulse 77   Resp 18   Ht 1.549 m (5' 1\")   Wt 103 kg (228 lb)   SpO2 96%   BMI 43.08 kg/m²     NECK CIRCUMFERENCE: 15.5 inches    ESS: 12  LUCINA: 20    Physical Exam  Constitutional: Awake, not in distress  Head: normocephalic, atraumatic  Craniofacial: no retrognathia  Sinus: no tenderness to palpation  Nose: deviated nasal septum, + bilateral inferior turbinate hypertrophy, hard to breathe on left nostril  Dental: Class 1 bite, + overjet, no crossbite  Palate: Narrow  Tonsils: surgically absent  Uvula: surgically absent  Tongue: Midline on protrusion, + Tongue scalloping, no macroglossia  Lungs: Clear to auscultation bilateral, no rales  Heart: Regular rate and rhythm, " no murmurs  Skin: Warm, no rash  Neuro: No tremors, moves all extremities  Psych: alert and oriented to time, place, and person    RESULTS/DATA     Iron (ug/dL)   Date Value   06/22/2024 57   07/12/2023 56   06/06/2022 57     % Saturation (%)   Date Value   06/22/2024 14 (L)     Iron Saturation (%)   Date Value   07/12/2023 13 (L)   06/06/2022 14 (L)     TIBC (ug/dL)   Date Value   06/22/2024 399   07/12/2023 439   06/06/2022 416     Ferritin   Date Value   06/22/2024 254 ng/mL (H)   07/12/2023 58 ug/L   05/17/2021 87 ug/L       Bicarbonate   Date Value Ref Range Status   06/22/2024 26 21 - 32 mmol/L Final       ASSESSMENT/PLAN     Iirna Kennedy is a 59 y.o. female who is seen today in [facility] for the following problems:.     OBSTRUCTIVE SLEEP APNEA  - Now on PAP of unknown type and unknown pressure settings. Her Resmed machine at home is likely more than 10 years old. She had a second machine (Dreamstation machine) which she mailed to the wrong place in 2022 because she got scammed. She has not gotten any replacement machine from [facility] since then.   - Patient reports using the old Resmed machine every night with improved SÁNCHEZ symptoms. She did not bring machine today in clinic but she states that she will bring machine next week so we can get download data.   - Due to machine issues beyond repair (ie pressure felt weaker than before), recommend getting a new machine such as auto-CPAP at the following settings: 8-16 cm H2O with EPR 3, heated humidification, heated tubings, and mask of preference. PAP order sent to [facility] [employer] [facility].    - Discussed sleep apnea in detail to include pathophysiology, risk factors, diagnostic options, treatment options, and cardiovascular / neurologic consequences of untreated SÁNCHEZ.   - Supportive management as follows: Lose weight. Stay off your back when sleeping. Avoid smoking, alcohol, and sedating medications if applicable. Don't drive  when sleepy.    ALLERGIC RHINITIS  - Start fluticasone nasal spray 2 sprays per nostril once daily 1 hour before bedtime.  - If there is inadequate or lack of improvement on the above intranasal steroid spray, consider adding Azelastine nasal spray, 2 sprays per nostril once daily in the morning.   - Alternatively, may add cetirizine or loratadine 10 mg once daily.   - Educated patient on proper use of intranasal sprays.     MORBID OBESITY  - Recommend weight loss with diet and exercise.  - Weight loss can help in long term management of SÁNCHEZ.  - Defer management to PCP.    Follow-up in 31-90 days after getting new machine.    All of patient's questions were answered. She verbalizes understanding and agreement with my assessment and plan. Refer to after-visit-summary (AVS) for patient education and more details on sleep study preparation, troubleshooting issues with PAP usage, proper cleaning instructions of PAP supplies, and usual recommended replacement schedule for each of the PAP supplies.

## 2024-07-13 NOTE — PATIENT INSTRUCTIONS
"Thank you for coming to the Sleep Medicine Clinic today! Your sleep medicine provider today was: Kayce Lopez MD Below is a summary of your treatment plan, patient education, other important information, and our contact numbers.      TREATMENT PLAN     - Start auto-CPAP. Order placed and sent to [facility] [employer] [employer].  - Please read the \"Patient Education\" section below for more detailed information. Try implementing tips, reminders, strategies, and supportive management.   - If not yet done, please sign up for [employer] to make a future schedule, send prescription requests, or send messages.    Follow-up Appointment:   Follow-up in 31-90 days after getting new machine.    PATIENT EDUCATION     OBSTRUCTIVE SLEEP APNEA (SÁNCHEZ) is a sleep disorder where your upper airway muscles relax during sleep and the airway intermittently and repetitively narrows and collapses leading to partially blocked airway (hypopnea) or completely blocked airway (apnea) which, in turn, can disrupt breathing in sleep, lower oxygen levels while you sleep and cause night time wakings. Because both apnea and hypopnea may cause higher carbon dioxide or low oxygen levels, untreated SÁNCHEZ can lead to heart arrhythmia, elevation of blood pressure, and make it harder for the body to consolidate memory and facilitate metabolism (leading to higher blood sugars at night). Frequent partial arousals occur during sleep resulting in sleep deprivation and daytime sleepiness. SÁNCHEZ is associated with an increased risk of cardiovascular disease, stroke, hypertension, and insulin resistance. Moreover, untreated SÁNCHEZ with excessive daytime sleepiness can increase the risk of motor vehicular accidents.    Below are conservative strategies for SÁNCHEZ regardless of SÁNCHEZ severity are:   Positional therapy - Avoid sleeping on your back.   Healthy diet and regular exercise to optimize weight is highly encouraged.   Avoid alcohol late in the evening and sedative-hypnotics as " these substances can make sleep apnea worse.   Improve breathing through the nose with intranasal steroid spray, saline rinse, or antihistamines    Safety: Avoid driving vehicle and operating heavy equipment while sleepy. Drowsy driving may lead to life-threatening motor vehicle accidents. A person driving while sleepy is 5 times more likely to have an accident. If you feel sleepy, pull over and take a short power nap (sleep for less than 30 minutes). Otherwise, ask somebody to drive you.    Treatment options for sleep apnea include weight management, positional therapy, Positive Airway Therapy (PAP) therapy, oral appliance therapy, hypoglossal nerve stimulator (Inspire) and select airway surgeries.    Starting Positive Airway Pressure (PAP): You were ordered a device to wear when you sleep called PAP (Positive Airway Pressure) to treat your sleep apnea. The order will be submitted to a durable medical equipment (DME) company who will arrange setting you up with the device. They will provide all the necessary equipment and discuss use and maintenance of the device with you as well as mask fitting and process of replacing / renewing PAP supplies or accessories. Once you get the machine, please start using it immediately. You may not be successful right away and that is okay. [place] be certain that you keep trying nightly and reach out to DME if you are struggling or having issues with machine usage.     *Please follow-up with me in 1-2 months of starting CPAP to see how well it is working for you and to do some troubleshooting if needed. Also, please bring all PAP equipment with you to follow up appointments unless told otherwise.     Important things to keep in mind as you start PAP:  Insurance will monitor your usage during the first 90 days. You should use your PAP - all night, every night, and including all naps (especially if naps are more than 30 minutes) for your health. The bare minimum is to use your PAP  device while sleeping for at least 4 hours per day at least 5-6 days per week.. Otherwise, your PAP device will be reclaimed by your DME company at 90 days.  There are many masks to choose from to wear with your PAP machine. If you are not comfortable with the first mask issued to you, call your DME company and ask for another option to try. You typically have a 30-day mask guarantee from the day you received your machine.   Discuss with your provider if you are having issues breathing with the machine or if the temperature or humidity feel uncomfortable.  Expect to have an adjustment period when you start your device. It helps to continuing wearing the machine every day for a period of time until you get more used to it. You can practice with wearing the mask alone if you need, then add in the PAP air pressure a few days later.   Reach out for help if you are struggling! The sleep medicine department can be reached at [phone]  We encourage you to download data monitoring apps to your phone. For "[employer]" 10/11 - MyAir franky. For directworx - DreamMapper. Both apps are available in the [employer] store for free and are a great tool to monitor your progress with your PAP device night to night.    Tips for success with PAP machine usage:  Comfortable and well-fitting mask  Appropriate pressure on the machine  Using humidification  Support from bed partner and clinical team      Maintaining your CPAP/BPAP device:    The humidification chamber (aka water tank or water chamber) needs to be filled with distilled water to prevent buildup of white deposits in the future. If you cannot find distilled water, you can use tap water but expect to have white deposits buildup seen after prolonged use with tap water. If you start seeing white deposits on the water chamber, you can clean it by filling it with equal parts of distilled white vinegar and water. Let the vinegar-water mixture sit for 2 hours, and then  rinse it with running tap water. Clean with soap and water then let it dry.     You should try to keep your machine clean in order to work well. Here are some tips to clean PAP supplies / accessories:    Clean the humidification chamber (aka water tank) as well as your mask and tubing at least once a week with soap and water.   Alternatively, you can fill a sink or basin with warm water and add a little mild detergent, like Ivory dish soap. Gently wipe your supplies with the soapy water to free all the oils and dirt that may have collected. Once that's done, rinse these items with clean water until the soap is gone and let them air dry. You can hang your tubing over the curtain charissa in your bathroom so that it dries.  The mask insert (part of the mask that has contact with your skin) needs to be cleaned with soap and water daily. Another option is to wipe them down with CPAP wipes or baby wipes.    You should replace your mask and tubing frequently in order to prevent bacteria buildup, machine damage, and mask seal issues. The older the mask and hose, the high likelihood that there is bacteria buildup in it especially if they are not cleaned regularly. Dirty filters damage machines because build-up of dust and contaminants can cause machine to over-heat, and in time, damage the motor of machine. Cushions lose their seal over time as most masks are made of plastic and silicone while headgear is made of neoprene. These materials will break down with age and frequent use. Here is the recommended replacement schedule for PAP supplies / accessories:    Twice a month- disposable filters and cushions for nasal mask or nasal pillows.  Once a month- cushion for full face mask  Every 3 months- mask with headgear and PAP tubing (standard or heated hose)  Every 6 months- reusable filter, water chamber, and chin strap     Other useful information:    Some people do not put water in the tank while other people prefers to put water  in the tank to prevent mouth dryness. Try to experiment to determine which is more comfortable for you.   In general, new machines have 2 years warranty on parts while health insurance allows you to have a new machine once every 5 years.     Common issues with PAP machine:    Mask gets dislodged when turning to the side: Consider getting a CPAP pillow or switching to a mask with hose on top.     Dry mouth:  Your machine has built-in humidifier that heats up the air to prevent dry mouth. It can be adjusted to your comfort. You can try that first and increase setting one level one night at a time to check which setting is comfortable and effective in lessening dry mouth. In some patients with heated hose, adjusting tube temperature to make air warmer can improve dry mouth. If dry mouth persists despite adjusting humidity or tube temperature setting, may apply OTC Biotene gel over the gums at bedtime.  If Biotene gel is not effective, consider trying XEROSTOM gel from [employer].  Also, eliminate or reduce dose of medications that can cause dry mouth if possible. Lastly, may try getting a separate room humidifier machine.    Airleaks: Please call DME as they may need to adjust your mask or refit you with a different kind or different size of mask. In addition, you can ask DME for tips on getting a good mask seal and mask fit.     Difficulty tolerating the mask: Contact your DME to try a different kind of mask and/or call office to get a referral to Sleep Psychologist for CPAP desensitization. CPAP desensitization technique is a set of strategies that helps patient cope with claustrophobia and anxiety related to wearing mask. Alternatively, we can do a daytime mini-sleep study called PAP-nap trial wherein you will try on different kinds of mask and the sleep technician will try different pressure settings on CPAP and BPAP machines to see which specific pressure is tolerable and comfortable for you.     Water droplets or  "moisture within the hose and/or mask: This is called rain-out and it is caused by condensation of too much heated humidity on the cooler walls of the hose. If you have rain-out, turn down humidity settings or get a heated hose. If you already have a heated hose, turn up the \"tube temperature\" of the heated hose. Alternatively, if you don't want to get a heated hose or warmer air, may wrap the CPAP hose with stockings to keep it somewhat warm. Also, you need to place the machine on the floor and lower the hose so that water won't travel upward towards your mask.     How to use nasal sprays properly: If you have nasal congestion or allergies, improving your breathing through the nose is critical for treating sleep apnea and improving your sleep. Hence, intranasal steroid spray like Flonase or Nasocort (generic name is Fluticasone) might help. In some patients with sleep apnea, using intranasal steroid spray allowed them to tolerate CPAP mask better.     Intranasal steroids are usually prescribed as 2 sprays per nostril 1 hour before bedtime. If you administer intranasal steroids too close to bedtime, it might not work as well.  If you have nasal congestion or allergies during day despite using Flonase at night, try adding Azelastine nasal spray 2 sprays per nostril in the morning. Alternatively, can consider adding over-the-counter non-sedating antihistamine medications.     However, if you have severe nasal congestion or allergies despite using both nasal sprays, consider seeing an allergy specialist to confirm which substance you are sensitive to and get definitive treatment which may be in the form of injections, oral pills, different kind of nose sprays, and/or a combination of everything said.     Below are instructions on how to use intranasal steroid spray properly:  Sit up or stand up with your face down.  Shake the spray bottle and insert its tip in one nostril.  Point the spray tip towards your ear, and not " towards the middle of your nose. Pointing the tip upward and in the middle of the nose can lead to discomfort and irritation of nasal mucosa which can lead to nose bleeding or coughing up tinges of blood.  Squeeze the spray bottle and administer the recommended amount of spray.   Don't sniff when you spray. Instead, remove the spray bottle and pinch your nose for 10 seconds.   Perform steps 1-6 on the other nostril.    You can also go to the following EDUCATION WEBSITES for further information:   American Academy of Sleep Medicine http://sleepeducation.org  National Sleep Foundation: https://sleepfoundation.org  American Sleep Apnea Association: https://www.sleepapnea.org (for patients with sleep apnea)  Narcolepsy Network: https://www.narcolepsynetwork.org (for patients with narcolepsy)  [employer]: https://www.RTN Stealth Softwarelepsy.org (for patients with narcolepsy)  Hypersomnia Foundation: https://www.hypersomniafoundation.org (for patients with idiopathic hypersomnia)  RLS foundation: https://www.rls.org (for patients with restless leg syndrome)    IMPORTANT INFORMATION     Call 911 for medical emergencies.  Our offices are generally open from Monday-Friday, 8 am - 5 pm.   There are no supporting services by either the sleep doctors or their staff on weekends and Holidays, or after 5 PM on weekdays.   If you need to get in touch with me, you may either call my office number or you can use [facility].  If a referral for a test, for CPAP, or for another specialist was made, and you have not heard about scheduling this within a week, please call scheduling at [phone] ([phone]).  If you are unable to make your appointment for clinic or an overnight study, kindly call the office or sleep testing center at least 48 hours in advance to cancel and reschedule.  If you are on CPAP, please bring your device's card and/or the device to each clinic appointment.   In case of problems with PAP machine or mask interface,  please contact your DME (Durable Medical Equipment) company first. DME is the company who provides you the machine and/or PAP supplies.       PRESCRIPTIONS     We require 7 days advanced notice for prescription refills. If we do not receive the request in this time, we cannot guarantee that your medication will be refilled in time.    IMPORTANT PHONE NUMBERS     Sleep Medicine Clinic Fax: [phone]  Appointments (for Pediatric Sleep Clinic): [phone] - option 1  Appointments (for Adult Sleep Clinic): [phone] - option 2  Appointments (For Sleep Studies): [phone] - option 3  Behavioral Sleep Medicine: [phone]  Sleep Surgery: [phone]  Nutrition Service: [phone]  Weight management clinics with endocrinology: [phone]  Bariatric Services: [phone] (includes weight loss medications and weight loss surgery)  [facility]: [phone] (offers holistic approaches to weight management)  ENT (Otolaryngology): [phone]  Headache Clinic (Neurology): [phone]  Neurology: [phone]  Psychiatry: [phone]  Pulmonary Function Testing (PFT) Center: [phone]  Pulmonary Medicine: [phone]  [employer] Service Company (DME): [phone]      OUR SLEEP TESTING LOCATIONS     Our team will contact you to schedule your sleep study, however, you can contact us as follow:  Main Phone Line (scheduling only): [phone] ([phone]), option 3  Adult and Pediatric Locations  [facility] (6 years and older): [facility] - 4th floor ([address] [place] [address]) After hours line: [phone]  [facility] at [facility] (Main campus: All ages): [facility], 6th floor. After hours line: [phone]   [place] (5 years and older; younger considered on case-by-case basis): [address]; [employer] [address], [address]. Scheduling  After hours line: [phone]   [place] (6 years and older): [address]; [address]   [place] (6 years and older): [address], [address]  After hours line: [phone]   [place] (13 years and older) in [place]: [address], 2nd floor  After hours line: [phone]   [place] (13 year and older): [address]  After hours line: [phone]     Adult Only Locations:   [place] (18 years and older): [address], 2nd floor   [place] (18 years and older): [address] [facility]; 4th floor  After hours line: [phone]  [facility] (18 years and older) at [place]: [address] [place] [address]  After hours line: [phone]     CONTACTING YOUR SLEEP MEDICINE PROVIDER AND SLEEP TEAM      For issues with your machine or mask interface, please call your DME provider first. DME stands for durable medical company. DME is the company who provides you the machine and/or PAP supplies / accessories.   To schedule, cancel, or reschedule SLEEP STUDY APPOINTMENTS, please call the Main Phone Line at [phone] - option 3.   To schedule, cancel, or reschedule CLINIC APPOINTMENTS, you can do it in \"[employer]\", call [phone] (direct line) to speak with my practice lead (Olga Cook), or call the Main Phone Line at [phone]) - option 2  For CLINICAL QUESTIONS or MEDICATION REFILLS, please call direct line for Adult Sleep Nurses at [phone].   Lastly, you can also send a message directly to your provider through \"My Chart\", which is the email service through your  Records Account: [email or web address]       Here at [facility], we wish you a restful sleep!    "

## 2024-08-02 ENCOUNTER — TELEPHONE (OUTPATIENT)
Dept: SLEEP MEDICINE | Facility: HOSPITAL | Age: 59
End: 2024-08-02
Payer: COMMERCIAL

## 2024-08-02 DIAGNOSIS — G47.33 OBSTRUCTIVE SLEEP APNEA SYNDROME: ICD-10-CM

## 2024-08-02 NOTE — TELEPHONE ENCOUNTER
Pt would like to change DME to [facility]. New order placed and PAP machine specified per pt. Request.

## 2024-08-09 PROBLEM — R06.09 DYSPNEA ON EXERTION: Status: ACTIVE | Noted: 2024-08-09

## 2024-08-09 PROBLEM — L82.1 OTHER SEBORRHEIC KERATOSIS: Status: ACTIVE | Noted: 2018-01-22

## 2024-08-09 PROBLEM — R92.8 ABNORMAL MAMMOGRAM: Status: ACTIVE | Noted: 2024-08-09

## 2024-08-09 PROBLEM — Z86.79 HISTORY OF VARICOSE VEINS: Status: ACTIVE | Noted: 2024-08-09

## 2024-08-12 ENCOUNTER — APPOINTMENT (OUTPATIENT)
Dept: CARDIOLOGY | Facility: CLINIC | Age: 59
End: 2024-08-12
Payer: COMMERCIAL

## 2024-09-04 ENCOUNTER — OFFICE VISIT (OUTPATIENT)
Dept: HEMATOLOGY/ONCOLOGY | Facility: CLINIC | Age: 59
End: 2024-09-04
Payer: COMMERCIAL

## 2024-09-04 VITALS
WEIGHT: 223.77 LBS | SYSTOLIC BLOOD PRESSURE: 90 MMHG | HEIGHT: 61 IN | OXYGEN SATURATION: 95 % | DIASTOLIC BLOOD PRESSURE: 63 MMHG | TEMPERATURE: 97.5 F | BODY MASS INDEX: 42.25 KG/M2 | HEART RATE: 79 BPM | RESPIRATION RATE: 16 BRPM

## 2024-09-04 DIAGNOSIS — D64.9 ANEMIA, UNSPECIFIED TYPE: ICD-10-CM

## 2024-09-04 DIAGNOSIS — R53.83 OTHER FATIGUE: Primary | ICD-10-CM

## 2024-09-04 DIAGNOSIS — R61 UNEXPLAINED NIGHT SWEATS: ICD-10-CM

## 2024-09-04 DIAGNOSIS — E61.1 IRON DEFICIENCY: Primary | ICD-10-CM

## 2024-09-04 LAB
ALBUMIN SERPL BCP-MCNC: 3.9 G/DL (ref 3.4–5)
ALP SERPL-CCNC: 75 U/L (ref 33–136)
ALT SERPL W P-5'-P-CCNC: 14 U/L (ref 7–45)
ANION GAP SERPL CALC-SCNC: 11 MMOL/L (ref 10–20)
AST SERPL W P-5'-P-CCNC: 17 U/L (ref 9–39)
BASOPHILS # BLD AUTO: 0.01 X10*3/UL (ref 0–0.1)
BASOPHILS NFR BLD AUTO: 0.3 %
BILIRUB SERPL-MCNC: 0.7 MG/DL (ref 0–1.2)
BUN SERPL-MCNC: 14 MG/DL (ref 6–23)
CALCIUM SERPL-MCNC: 8.3 MG/DL (ref 8.6–10.3)
CHLORIDE SERPL-SCNC: 107 MMOL/L (ref 98–107)
CO2 SERPL-SCNC: 26 MMOL/L (ref 21–32)
CREAT SERPL-MCNC: 0.59 MG/DL (ref 0.5–1.05)
CRP SERPL-MCNC: 0.35 MG/DL
EBV EA IGG SER QL: NEGATIVE
EBV NA AB SER QL: POSITIVE
EBV VCA IGG SER IA-ACNC: POSITIVE
EBV VCA IGM SER IA-ACNC: NEGATIVE
EGFRCR SERPLBLD CKD-EPI 2021: >90 ML/MIN/1.73M*2
EOSINOPHIL # BLD AUTO: 0 X10*3/UL (ref 0–0.7)
EOSINOPHIL NFR BLD AUTO: 0 %
ERYTHROCYTE [DISTWIDTH] IN BLOOD BY AUTOMATED COUNT: 17.1 % (ref 11.5–14.5)
ERYTHROCYTE [SEDIMENTATION RATE] IN BLOOD BY WESTERGREN METHOD: 2 MM/H (ref 0–30)
FERRITIN SERPL-MCNC: 124 NG/ML (ref 8–150)
FOLATE SERPL-MCNC: 7.7 NG/ML
GLUCOSE SERPL-MCNC: 83 MG/DL (ref 74–99)
HCT VFR BLD AUTO: 35.8 % (ref 36–46)
HGB BLD-MCNC: 11.1 G/DL (ref 12–16)
HGB RETIC QN: 28 PG (ref 28–38)
IMM GRANULOCYTES # BLD AUTO: 0.01 X10*3/UL (ref 0–0.7)
IMM GRANULOCYTES NFR BLD AUTO: 0.3 % (ref 0–0.9)
IMMATURE RETIC FRACTION: 13.4 %
IRON SATN MFR SERPL: 11 % (ref 25–45)
IRON SERPL-MCNC: 46 UG/DL (ref 35–150)
LDH SERPL L TO P-CCNC: 231 U/L (ref 84–246)
LYMPHOCYTES # BLD AUTO: 1.09 X10*3/UL (ref 1.2–4.8)
LYMPHOCYTES NFR BLD AUTO: 29.4 %
MCH RBC QN AUTO: 25.5 PG (ref 26–34)
MCHC RBC AUTO-ENTMCNC: 31 G/DL (ref 32–36)
MCV RBC AUTO: 82 FL (ref 80–100)
MONOCYTES # BLD AUTO: 0.46 X10*3/UL (ref 0.1–1)
MONOCYTES NFR BLD AUTO: 12.4 %
NEUTROPHILS # BLD AUTO: 2.14 X10*3/UL (ref 1.2–7.7)
NEUTROPHILS NFR BLD AUTO: 57.6 %
NRBC BLD-RTO: ABNORMAL /100{WBCS}
PLATELET # BLD AUTO: 224 X10*3/UL (ref 150–450)
POTASSIUM SERPL-SCNC: 4.1 MMOL/L (ref 3.5–5.3)
PROT SERPL-MCNC: 5.6 G/DL (ref 6.4–8.2)
PROT SERPL-MCNC: 5.9 G/DL (ref 6.4–8.2)
RBC # BLD AUTO: 4.35 X10*6/UL (ref 4–5.2)
RETICS #: 0.14 X10*6/UL (ref 0.02–0.08)
RETICS/RBC NFR AUTO: 3.2 % (ref 0.5–2)
SODIUM SERPL-SCNC: 140 MMOL/L (ref 136–145)
TIBC SERPL-MCNC: 418 UG/DL (ref 240–445)
UIBC SERPL-MCNC: 372 UG/DL (ref 110–370)
VIT B12 SERPL-MCNC: 314 PG/ML (ref 211–911)
WBC # BLD AUTO: 3.7 X10*3/UL (ref 4.4–11.3)

## 2024-09-04 PROCEDURE — 85652 RBC SED RATE AUTOMATED: CPT

## 2024-09-04 PROCEDURE — 83540 ASSAY OF IRON: CPT

## 2024-09-04 PROCEDURE — 86334 IMMUNOFIX E-PHORESIS SERUM: CPT | Mod: GEALAB

## 2024-09-04 PROCEDURE — 99205 OFFICE O/P NEW HI 60 MIN: CPT

## 2024-09-04 PROCEDURE — 85025 COMPLETE CBC W/AUTO DIFF WBC: CPT

## 2024-09-04 PROCEDURE — 99215 OFFICE O/P EST HI 40 MIN: CPT

## 2024-09-04 PROCEDURE — 82728 ASSAY OF FERRITIN: CPT

## 2024-09-04 PROCEDURE — 86140 C-REACTIVE PROTEIN: CPT

## 2024-09-04 PROCEDURE — 83615 LACTATE (LD) (LDH) ENZYME: CPT

## 2024-09-04 PROCEDURE — 84155 ASSAY OF PROTEIN SERUM: CPT

## 2024-09-04 PROCEDURE — 82607 VITAMIN B-12: CPT | Mod: GEALAB

## 2024-09-04 PROCEDURE — 83010 ASSAY OF HAPTOGLOBIN QUANT: CPT | Mod: GEALAB

## 2024-09-04 PROCEDURE — 36415 COLL VENOUS BLD VENIPUNCTURE: CPT

## 2024-09-04 PROCEDURE — 82746 ASSAY OF FOLIC ACID SERUM: CPT | Mod: GEALAB

## 2024-09-04 PROCEDURE — 83521 IG LIGHT CHAINS FREE EACH: CPT | Mod: GEALAB

## 2024-09-04 PROCEDURE — 86663 EPSTEIN-BARR ANTIBODY: CPT | Mod: GEALAB

## 2024-09-04 PROCEDURE — 85045 AUTOMATED RETICULOCYTE COUNT: CPT

## 2024-09-04 PROCEDURE — 3008F BODY MASS INDEX DOCD: CPT

## 2024-09-04 RX ORDER — DIPHENHYDRAMINE HYDROCHLORIDE 50 MG/ML
50 INJECTION INTRAMUSCULAR; INTRAVENOUS AS NEEDED
OUTPATIENT
Start: 2024-09-11

## 2024-09-04 RX ORDER — EPINEPHRINE 0.3 MG/.3ML
0.3 INJECTION SUBCUTANEOUS EVERY 5 MIN PRN
OUTPATIENT
Start: 2024-09-11

## 2024-09-04 RX ORDER — ALBUTEROL SULFATE 0.83 MG/ML
3 SOLUTION RESPIRATORY (INHALATION) AS NEEDED
OUTPATIENT
Start: 2024-09-11

## 2024-09-04 RX ORDER — HEPARIN 100 UNIT/ML
500 SYRINGE INTRAVENOUS AS NEEDED
OUTPATIENT
Start: 2024-09-04

## 2024-09-04 RX ORDER — HEPARIN SODIUM,PORCINE/PF 10 UNIT/ML
50 SYRINGE (ML) INTRAVENOUS AS NEEDED
OUTPATIENT
Start: 2024-09-04

## 2024-09-04 RX ORDER — FAMOTIDINE 10 MG/ML
20 INJECTION INTRAVENOUS ONCE AS NEEDED
OUTPATIENT
Start: 2024-09-11

## 2024-09-04 ASSESSMENT — PAIN SCALES - GENERAL: PAINLEVEL: 0-NO PAIN
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
## 2024-09-05 LAB
HAPTOGLOB SERPL NEPH-MCNC: <30 MG/DL (ref 30–200)
KAPPA LC SERPL-MCNC: 0.95 MG/DL (ref 0.33–1.94)
KAPPA LC/LAMBDA SER: 0.95 {RATIO} (ref 0.26–1.65)
LAMBDA LC SERPL-MCNC: 1 MG/DL (ref 0.57–2.63)

## 2024-09-07 LAB
ALBUMIN: 3.9 G/DL (ref 3.4–5)
ALPHA 1 GLOBULIN: 0.3 G/DL (ref 0.2–0.6)
ALPHA 2 GLOBULIN: 0.6 G/DL (ref 0.4–1.1)
BETA GLOBULIN: 0.7 G/DL (ref 0.5–1.2)
GAMMA GLOBULIN: 0.4 G/DL (ref 0.5–1.4)
IMMUNOFIXATION COMMENT: ABNORMAL
PATH REVIEW - SERUM IMMUNOFIXATION: ABNORMAL
PATH REVIEW-SERUM PROTEIN ELECTROPHORESIS: ABNORMAL
PROTEIN ELECTROPHORESIS COMMENT: ABNORMAL

## 2024-09-09 LAB
CELL COUNT (BLOOD): 3.7 X10*3/UL
CELL POPULATIONS: NORMAL
CYTOGENETICS/MOLECULAR TEST ORDERED: NORMAL
DIAGNOSIS: NORMAL
FLOW DIFFERENTIAL: NORMAL
FLOW TEST ORDERED: NORMAL
LAB TEST METHOD: NORMAL
NUMBER OF CELLS COLLECTED: NORMAL
PATH REPORT.TOTAL CANCER: NORMAL
RBC MORPH BLD: NORMAL
SIGNATURE COMMENT: NORMAL
SPECIMEN VIABILITY: NORMAL
WBC MORPH BLD: NORMAL

## 2024-09-10 ENCOUNTER — TELEPHONE (OUTPATIENT)
Dept: HEMATOLOGY/ONCOLOGY | Facility: HOSPITAL | Age: 59
End: 2024-09-10
Payer: COMMERCIAL

## 2024-09-10 NOTE — TELEPHONE ENCOUNTER
Received below message:   ----- Message from Carly Gaines sent at 9/6/2024  5:17 PM EDT -----  There is iron deficiency.  I am willing to give her a dose of IV iron.  We could order this so pre-CERT pens quicker however we could also discuss at upcoming visit.  ----- Message -----  From: Lab, Background User  Sent: 9/4/2024  10:07 AM EDT  To: RUDI Cox-CNP    Called patient, she has iron infusions scheduled for 9/12, 9/19. Follow up visit scheduled for 9/25. Patient did have questions regarding some of her lab work, reticulocyte count. Forwarded to Carly Gaines CNP.

## 2024-09-11 ENCOUNTER — TELEPHONE (OUTPATIENT)
Dept: HEMATOLOGY/ONCOLOGY | Facility: CLINIC | Age: 59
End: 2024-09-11
Payer: COMMERCIAL

## 2024-09-11 DIAGNOSIS — E61.1 IRON DEFICIENCY: Primary | ICD-10-CM

## 2024-09-11 DIAGNOSIS — D64.9 ANEMIA, UNSPECIFIED TYPE: ICD-10-CM

## 2024-09-11 LAB
ELECTRONICALLY SIGNED BY: NORMAL
MYELOID NGS RESULTS: NORMAL

## 2024-09-12 ENCOUNTER — INFUSION (OUTPATIENT)
Dept: HEMATOLOGY/ONCOLOGY | Facility: CLINIC | Age: 59
End: 2024-09-12
Payer: COMMERCIAL

## 2024-09-12 ENCOUNTER — APPOINTMENT (OUTPATIENT)
Dept: HEMATOLOGY/ONCOLOGY | Facility: CLINIC | Age: 59
End: 2024-09-12
Payer: COMMERCIAL

## 2024-09-12 VITALS
SYSTOLIC BLOOD PRESSURE: 121 MMHG | OXYGEN SATURATION: 100 % | RESPIRATION RATE: 16 BRPM | BODY MASS INDEX: 42.42 KG/M2 | DIASTOLIC BLOOD PRESSURE: 74 MMHG | HEART RATE: 75 BPM | TEMPERATURE: 97.2 F | WEIGHT: 222.66 LBS

## 2024-09-12 DIAGNOSIS — E61.1 IRON DEFICIENCY: ICD-10-CM

## 2024-09-12 PROCEDURE — 2500000004 HC RX 250 GENERAL PHARMACY W/ HCPCS (ALT 636 FOR OP/ED): Mod: JZ

## 2024-09-12 PROCEDURE — 96365 THER/PROPH/DIAG IV INF INIT: CPT | Mod: INF

## 2024-09-12 RX ORDER — ALBUTEROL SULFATE 0.83 MG/ML
3 SOLUTION RESPIRATORY (INHALATION) AS NEEDED
OUTPATIENT
Start: 2024-09-19

## 2024-09-12 RX ORDER — EPINEPHRINE 0.3 MG/.3ML
0.3 INJECTION SUBCUTANEOUS EVERY 5 MIN PRN
OUTPATIENT
Start: 2024-09-19

## 2024-09-12 RX ORDER — DIPHENHYDRAMINE HYDROCHLORIDE 50 MG/ML
50 INJECTION INTRAMUSCULAR; INTRAVENOUS AS NEEDED
OUTPATIENT
Start: 2024-09-19

## 2024-09-12 RX ORDER — FAMOTIDINE 10 MG/ML
20 INJECTION INTRAVENOUS ONCE AS NEEDED
OUTPATIENT
Start: 2024-09-19

## 2024-09-12 ASSESSMENT — PAIN SCALES - GENERAL
PAINLEVEL_OUTOF10: 0-NO PAIN
PAINLEVEL: 0-NO PAIN

## 2024-09-13 DIAGNOSIS — D72.829 LEUKOCYTOSIS, UNSPECIFIED TYPE: Primary | ICD-10-CM

## 2024-09-13 DIAGNOSIS — D64.9 ANEMIA, UNSPECIFIED TYPE: ICD-10-CM

## 2024-09-13 NOTE — PROGRESS NOTES
Spoke with Patient discussed lab results.  She will have hemolytic labs drawn.  CT chest abdomen pelvis to look for lymphadenopathy.  She will have her second iron infusion next week, tolerating well.  We will meet October 23rd at 8 AM virtually.

## 2024-09-18 ENCOUNTER — APPOINTMENT (OUTPATIENT)
Dept: RADIOLOGY | Facility: HOSPITAL | Age: 59
End: 2024-09-18
Payer: COMMERCIAL

## 2024-09-19 ENCOUNTER — INFUSION (OUTPATIENT)
Dept: HEMATOLOGY/ONCOLOGY | Facility: CLINIC | Age: 59
End: 2024-09-19
Payer: COMMERCIAL

## 2024-09-19 ENCOUNTER — APPOINTMENT (OUTPATIENT)
Dept: HEMATOLOGY/ONCOLOGY | Facility: CLINIC | Age: 59
End: 2024-09-19
Payer: COMMERCIAL

## 2024-09-19 VITALS
HEART RATE: 71 BPM | RESPIRATION RATE: 18 BRPM | DIASTOLIC BLOOD PRESSURE: 67 MMHG | WEIGHT: 222.88 LBS | BODY MASS INDEX: 42.46 KG/M2 | OXYGEN SATURATION: 97 % | SYSTOLIC BLOOD PRESSURE: 107 MMHG | TEMPERATURE: 97.7 F

## 2024-09-19 DIAGNOSIS — D72.829 LEUKOCYTOSIS, UNSPECIFIED TYPE: ICD-10-CM

## 2024-09-19 DIAGNOSIS — D64.9 ANEMIA, UNSPECIFIED TYPE: ICD-10-CM

## 2024-09-19 DIAGNOSIS — E61.1 IRON DEFICIENCY: ICD-10-CM

## 2024-09-19 LAB
APTT PPP: 26 SECONDS (ref 27–38)
BASOPHILS # BLD AUTO: 0.01 X10*3/UL (ref 0–0.1)
BASOPHILS NFR BLD AUTO: 0.3 %
BILIRUB DIRECT SERPL-MCNC: 0.1 MG/DL (ref 0–0.3)
C3 SERPL-MCNC: 134 MG/DL (ref 87–200)
C4 SERPL-MCNC: 52 MG/DL (ref 10–50)
DAT-POLYSPECIFIC: NORMAL
EOSINOPHIL # BLD AUTO: 0 X10*3/UL (ref 0–0.7)
EOSINOPHIL NFR BLD AUTO: 0 %
ERYTHROCYTE [DISTWIDTH] IN BLOOD BY AUTOMATED COUNT: 16.2 % (ref 11.5–14.5)
HCT VFR BLD AUTO: 33.9 % (ref 36–46)
HGB BLD-MCNC: 10.8 G/DL (ref 12–16)
IMM GRANULOCYTES # BLD AUTO: 0.01 X10*3/UL (ref 0–0.7)
IMM GRANULOCYTES NFR BLD AUTO: 0.3 % (ref 0–0.9)
INR PPP: 1.1 (ref 0.9–1.1)
LYMPHOCYTES # BLD AUTO: 0.88 X10*3/UL (ref 1.2–4.8)
LYMPHOCYTES NFR BLD AUTO: 24.2 %
MCH RBC QN AUTO: 25.5 PG (ref 26–34)
MCHC RBC AUTO-ENTMCNC: 31.9 G/DL (ref 32–36)
MCV RBC AUTO: 80 FL (ref 80–100)
MONOCYTES # BLD AUTO: 0.52 X10*3/UL (ref 0.1–1)
MONOCYTES NFR BLD AUTO: 14.3 %
NEUTROPHILS # BLD AUTO: 2.22 X10*3/UL (ref 1.2–7.7)
NEUTROPHILS NFR BLD AUTO: 60.9 %
NRBC BLD-RTO: ABNORMAL /100{WBCS}
PLATELET # BLD AUTO: 217 X10*3/UL (ref 150–450)
PROTHROMBIN TIME: 12 SECONDS (ref 9.8–12.8)
RBC # BLD AUTO: 4.24 X10*6/UL (ref 4–5.2)
WBC # BLD AUTO: 3.6 X10*3/UL (ref 4.4–11.3)

## 2024-09-19 PROCEDURE — 86880 COOMBS TEST DIRECT: CPT

## 2024-09-19 PROCEDURE — 96365 THER/PROPH/DIAG IV INF INIT: CPT | Mod: INF

## 2024-09-19 PROCEDURE — 85610 PROTHROMBIN TIME: CPT

## 2024-09-19 PROCEDURE — 86160 COMPLEMENT ANTIGEN: CPT

## 2024-09-19 PROCEDURE — 2500000004 HC RX 250 GENERAL PHARMACY W/ HCPCS (ALT 636 FOR OP/ED): Mod: JZ

## 2024-09-19 PROCEDURE — 85025 COMPLETE CBC W/AUTO DIFF WBC: CPT

## 2024-09-19 PROCEDURE — 82248 BILIRUBIN DIRECT: CPT

## 2024-09-19 RX ORDER — DIPHENHYDRAMINE HYDROCHLORIDE 50 MG/ML
50 INJECTION INTRAMUSCULAR; INTRAVENOUS AS NEEDED
OUTPATIENT
Start: 2024-09-19

## 2024-09-19 RX ORDER — EPINEPHRINE 0.3 MG/.3ML
0.3 INJECTION SUBCUTANEOUS EVERY 5 MIN PRN
OUTPATIENT
Start: 2024-09-19

## 2024-09-19 RX ORDER — FAMOTIDINE 10 MG/ML
20 INJECTION INTRAVENOUS ONCE AS NEEDED
OUTPATIENT
Start: 2024-09-19

## 2024-09-19 RX ORDER — ALBUTEROL SULFATE 0.83 MG/ML
3 SOLUTION RESPIRATORY (INHALATION) AS NEEDED
OUTPATIENT
Start: 2024-09-19

## 2024-09-19 ASSESSMENT — PAIN SCALES - GENERAL: PAINLEVEL: 0-NO PAIN

## 2024-09-25 ENCOUNTER — APPOINTMENT (OUTPATIENT)
Dept: HEMATOLOGY/ONCOLOGY | Facility: CLINIC | Age: 59
End: 2024-09-25
Payer: COMMERCIAL

## 2024-09-27 ENCOUNTER — HOSPITAL ENCOUNTER (OUTPATIENT)
Dept: RADIOLOGY | Facility: CLINIC | Age: 59
Discharge: HOME | End: 2024-09-27
Payer: COMMERCIAL

## 2024-09-27 DIAGNOSIS — D72.829 LEUKOCYTOSIS, UNSPECIFIED TYPE: ICD-10-CM

## 2024-09-27 PROCEDURE — 2550000001 HC RX 255 CONTRASTS

## 2024-09-27 PROCEDURE — 74177 CT ABD & PELVIS W/CONTRAST: CPT

## 2024-10-03 ENCOUNTER — TELEPHONE (OUTPATIENT)
Dept: HEMATOLOGY/ONCOLOGY | Facility: CLINIC | Age: 59
End: 2024-10-03
Payer: COMMERCIAL

## 2024-10-03 DIAGNOSIS — R53.83 OTHER FATIGUE: ICD-10-CM

## 2024-10-03 DIAGNOSIS — D64.9 ANEMIA, UNSPECIFIED TYPE: ICD-10-CM

## 2024-10-03 DIAGNOSIS — R61 UNEXPLAINED NIGHT SWEATS: ICD-10-CM

## 2024-10-03 DIAGNOSIS — D75.9 CLONAL CYTOPENIA OF UNDETERMINED SIGNIFICANCE (CCUS): ICD-10-CM

## 2024-10-03 DIAGNOSIS — N87.0 MILD CERVICAL DYSPLASIA: ICD-10-CM

## 2024-10-03 DIAGNOSIS — D72.829 LEUKOCYTOSIS, UNSPECIFIED TYPE: Primary | ICD-10-CM

## 2024-10-03 NOTE — PROGRESS NOTES
Spoke with patient. Pet CT ordered to assess for lymphoproliferative disorder. New hepatosplenomegaly. Positive B symptoms. Cytopenia. Patient in agreement.

## 2024-10-11 ENCOUNTER — OFFICE VISIT (OUTPATIENT)
Dept: SLEEP MEDICINE | Facility: CLINIC | Age: 59
End: 2024-10-11
Payer: COMMERCIAL

## 2024-10-11 VITALS
OXYGEN SATURATION: 99 % | HEIGHT: 60 IN | HEART RATE: 70 BPM | DIASTOLIC BLOOD PRESSURE: 83 MMHG | RESPIRATION RATE: 16 BRPM | SYSTOLIC BLOOD PRESSURE: 118 MMHG | BODY MASS INDEX: 42.41 KG/M2 | WEIGHT: 216 LBS

## 2024-10-11 DIAGNOSIS — G47.33 OSA (OBSTRUCTIVE SLEEP APNEA): ICD-10-CM

## 2024-10-11 DIAGNOSIS — G47.33 OSA ON CPAP: ICD-10-CM

## 2024-10-11 DIAGNOSIS — J30.2 SEASONAL ALLERGIES: Primary | ICD-10-CM

## 2024-10-11 PROCEDURE — 99214 OFFICE O/P EST MOD 30 MIN: CPT | Performed by: INTERNAL MEDICINE

## 2024-10-11 PROCEDURE — G2211 COMPLEX E/M VISIT ADD ON: HCPCS | Performed by: INTERNAL MEDICINE

## 2024-10-11 PROCEDURE — 3008F BODY MASS INDEX DOCD: CPT | Performed by: INTERNAL MEDICINE

## 2024-10-11 RX ORDER — FLUTICASONE PROPIONATE 50 MCG
SPRAY, SUSPENSION (ML) NASAL
Qty: 16 G | Refills: 3 | Status: SHIPPED | OUTPATIENT
Start: 2024-10-11

## 2024-10-11 ASSESSMENT — PATIENT HEALTH QUESTIONNAIRE - PHQ9
2. FEELING DOWN, DEPRESSED OR HOPELESS: NOT AT ALL
SUM OF ALL RESPONSES TO PHQ9 QUESTIONS 1 AND 2: 0
1. LITTLE INTEREST OR PLEASURE IN DOING THINGS: NOT AT ALL

## 2024-10-11 ASSESSMENT — PAIN SCALES - GENERAL: PAINLEVEL: 0-NO PAIN

## 2024-10-11 ASSESSMENT — SLEEP AND FATIGUE QUESTIONNAIRES
HOW LIKELY ARE YOU TO NOD OFF OR FALL ASLEEP WHILE SITTING QUIETLY AFTER LUNCH WITHOUT ALCOHOL: WOULD NEVER DOZE
SITING INACTIVE IN A PUBLIC PLACE LIKE A CLASS ROOM OR A MOVIE THEATER: WOULD NEVER DOZE
HOW LIKELY ARE YOU TO NOD OFF OR FALL ASLEEP WHILE WATCHING TV: SLIGHT CHANCE OF DOZING
HOW LIKELY ARE YOU TO NOD OFF OR FALL ASLEEP IN A CAR, WHILE STOPPED FOR A FEW MINUTES IN TRAFFIC: WOULD NEVER DOZE
HOW LIKELY ARE YOU TO NOD OFF OR FALL ASLEEP WHEN YOU ARE A PASSENGER IN A CAR FOR AN HOUR WITHOUT A BREAK: SLIGHT CHANCE OF DOZING
HOW LIKELY ARE YOU TO NOD OFF OR FALL ASLEEP WHILE LYING DOWN TO REST IN THE AFTERNOON WHEN CIRCUMSTANCES PERMIT: SLIGHT CHANCE OF DOZING
HOW LIKELY ARE YOU TO NOD OFF OR FALL ASLEEP WHILE SITTING AND READING: SLIGHT CHANCE OF DOZING
HOW LIKELY ARE YOU TO NOD OFF OR FALL ASLEEP WHILE SITTING AND TALKING TO SOMEONE: WOULD NEVER DOZE
ESS-CHAD TOTAL SCORE: 4

## 2024-10-11 NOTE — PROGRESS NOTES
[facility] SLEEP MEDICINE   FOLLOW-UP VISIT NOTE    PCP: João Bhakta DO    HISTORY OF PRESENT ILLNESS     Patient ID: Irina Kennedy is a 59 y.o. female who presents for follow-up of SÁNCHEZ after getting a new machine to replace old machine.     Patient is here alone today.  To review, patient's medical history is notable for morbid obesity (BMI 43), s/p gastric bypass in 2004, allergic rhinitis, asthma, depression, and SÁNCHEZ on CPAP.      Interval History  Patient was last seen in 7/12/2024. Plan was to get a new machine to replace old machine .  Since last visit, patient has been using machine every night. Current mask interface being used is F&P Solo nasal  mask. Per records, patient is getting supplies thru [employer] [facility] [employer]  Patient denies machine problems, perceived mask leak, air hunger, aerophagia, dry mouth, skin irritation. Complains of nasal congestion from seasonal allergies.  The following are patient's perceived benefits of PAP: decreased snoring / choking / gasping    RLS Follow-up:   Denies    SLEEP STUDY HISTORY (personally reviewed raw data such as interpretation report, data sheet, hypnogram, and titration table if available and applicable)  Split night PSG 2/16/2001: pre-PAP AHI 24.7, SpO2 andrew 74%. CPAP was started. At CPAP 11 cm H2O, there are significant decrease in apnea and hypopnea (nothing reported in the report though) and SpO2 andrew 93-94%  PAP titration 12/20/2004: CPAP was started at 5-6 cm H2O. RDI 4.9, REM RDI 11.9, SpO2 andrew 91%    SLEEP-WAKE SCHEDULE  Bedtime:  10:30 PM daily  Subjective sleep latency: 2 seconds  Difficulty falling asleep: No  Number of awakenings:  1-2 times per night spontaneously for unknown reasons without CPAP, sleeps thru the night on CPAP unless she missed her Botox shot for the urinary bladder, in which case, she will wake up once per night to go to bathroom  Nocturia: No  Falls back asleep in 30 minutes  Final wake time:  5:30 AM daily  Out  of bed time:  5:30 AM daily  Shift work: No   Naps: 2 times per week for 30 minutes  Feels rested after a nap: Yes (not using CPAP during naps)  Average sleep duration (excluding naps): 5 hours without CPAP, 7 hours on CPAP     SLEEP ENVIRONMENT  Sleep location: bed  Sleep status: sleeps alone  Preferred sleep position: side    SOCIAL HISTORY  Smoking: no  ETOH: no  Marijuana: no  Caffeine: 1 cup coffee daily in the morning  Sleep aids: no    WEIGHT:  lost 100 lbs after Mario-en-Y in 2004, lost 8 lbs in 3 months    Claustrophobia: No     REVIEW OF SYSTEMS     All other systems have been reviewed and are negative.    ALLERGIES     Allergies   Allergen Reactions    Heparin Hives    Heparin Analogues Hives    Nsaids (Non-Steroidal Anti-Inflammatory Drug) Other     had bariatric surgery    Quaternary Ammonium Cmpds,C12-C16 Alkyldimethyl,Cl Hives     quaternary- found in sanitizers/ disinfectives    Penicillins Hives, Rash and Unknown       MEDICATIONS     Current Outpatient Medications   Medication Sig Dispense Refill    albuterol (Ventolin HFA) 90 mcg/actuation inhaler Inhale 2 puffs every 4 hours if needed for wheezing or shortness of breath. 8 g 1    albuterol 2.5 mg /3 mL (0.083 %) nebulizer solution Take 3 mL (2.5 mg) by nebulization 4 times a day as needed for wheezing or shortness of breath. 25 mL 3    buPROPion XL (Wellbutrin XL) 300 mg 24 hr tablet Take 1 tablet (300 mg) by mouth once daily in the morning. Do not crush, chew, or split. 90 tablet 1    cholecalciferol (Vitamin D-3) 25 MCG (1000 UT) tablet Take by mouth once a week.      cyanocobalamin (Vitamin B-12) 1,000 mcg tablet Take 1 tablet (1,000 mcg) by mouth once daily. 90 tablet 1    estradiol (Estrace) 0.01 % (0.1 mg/gram) vaginal cream Insert 1 Applicatorful (4 g) into the vagina once daily. 42.5 g 3    loratadine (Claritin) 10 mg tablet Take 1 tablet (10 mg) by mouth once daily.      mv-calcium-min-iron fm-FA-vitK (Multi For Her) 18 mg iron-600 mcg-80  mcg tablet Take by mouth.      polyethylene glycol-electrolytes (Nulytely) 420 gram solution Drink 1/2 starting at 6 pm the night before your procedure then drink the 2nd 1/2 5 hours before procedure arrival tme 4000 mL 0    fluticasone (Flonase) 50 mcg/actuation nasal spray Administer 2 sprays into each nostril once daily. (Patient not taking: Reported on 9/19/2024) 16 g 1     No current facility-administered medications for this visit.       Active Problems, Allergy List, Medication List, and PMH/PSH/FH/Social Hx have been reviewed and reconciled in chart. No significant changes unless documented in the pertinent chart section. Updates made when necessary.       PHYSICAL EXAM     VITAL SIGNS: /83   Pulse 70   Resp 16   Ht 1.524 m (5')   Wt 98 kg (216 lb)   SpO2 99%   BMI 42.18 kg/m²     NECK CIRCUMFERENCE: 15.5 inches    Today ESS: 4  Last visit ESS: 12  Last visit LUCINA: 20    Physical Exam  Constitutional: Awake, not in distress  Lungs: Clear to auscultation bilateral, no rales  Heart: Regular rate and rhythm, no murmurs  Skin: Warm, no rash  Neuro: No tremors, moves all extremities  Psych: alert and oriented to time, place, and person    RESULTS/DATA     Iron (ug/dL)   Date Value   09/04/2024 46   06/22/2024 57     % Saturation (%)   Date Value   09/04/2024 11 (L)   06/22/2024 14 (L)     TIBC (ug/dL)   Date Value   09/04/2024 418   06/22/2024 399     Ferritin (ng/mL)   Date Value   09/04/2024 124   06/22/2024 254 (H)       Bicarbonate   Date Value Ref Range Status   09/04/2024 26 21 - 32 mmol/L Final       PAP Adherence  A PAP adherence data was obtained and reviewed personally today in clinic. (see scanned document in EPIC)  Machine: [employer] Airsense 11 Autoset  Settings:  auto-CPAP 8-16 cm H2O and EPR 3  Date Range: 9/11/2024 to 10/10/2024  Days used: 27/30  >4 hrs usage: 80%  Average usage hours (days used): 5 hours 10 minutes  Pressure: Median 9.6, 95th percentile 13.5, Maximum 14.4  Leak: 95th  percentile 25.6, maximum 36.9  AHI: 1.5 (RERA index 0.5, VIC 0.1)    ASSESSMENT/PLAN     Irina Kennedy is a 59 y.o. female who returns in [facility] for the following problems:    OBSTRUCTIVE SLEEP APNEA  - Now on auto-CPAP 8-16 cm H2O and EPR 3  - PAP adherence data reviewed today. Usage days 27/30, days> 4 hours at 80%, residual AHI 1.5.   - Patient reports improvement of SÁNCHEZ symptoms.   - Continue current machine settings. Continue using machine every night all night.  - Continue supportive management as follows: Lose weight. Stay off your back when sleeping. Avoid smoking, alcohol, and sedating medications if applicable. Don't drive when sleepy.    ALLERGIC RHINITIS   - Start fluticasone nasal spray 2 sprays per nostril once daily 1 hour before bedtime.  - If there is inadequate or lack of improvement on the above intranasal steroid spray, consider adding Azelastine nasal spray, 2 sprays per nostril once daily in the morning.   - Alternatively, may add cetirizine or loratadine 10 mg once daily.     MORBID OBESITY  - Recommend weight loss with diet and exercise.  - Weight loss can help in long term management of SÁNCHEZ.  - Defer management to PCP.      Follow-up in 1 year.    All of patient's questions were answered. She verbalizes understanding and agreement with my assessment and plan. Refer to after-visit-summary (AVS) for patient education and if applicable, for more details on sleep study preparation, troubleshooting issues with PAP usage, proper cleaning instructions of PAP supplies, and usual recommended replacement schedule for each of the PAP supplies.

## 2024-10-21 ENCOUNTER — HOSPITAL ENCOUNTER (OUTPATIENT)
Dept: RADIOLOGY | Facility: CLINIC | Age: 59
Discharge: HOME | End: 2024-10-21
Payer: COMMERCIAL

## 2024-10-21 DIAGNOSIS — R53.83 OTHER FATIGUE: ICD-10-CM

## 2024-10-21 DIAGNOSIS — R61 UNEXPLAINED NIGHT SWEATS: ICD-10-CM

## 2024-10-21 DIAGNOSIS — N87.0 MILD CERVICAL DYSPLASIA: ICD-10-CM

## 2024-10-21 DIAGNOSIS — D75.9 CLONAL CYTOPENIA OF UNDETERMINED SIGNIFICANCE (CCUS): ICD-10-CM

## 2024-10-21 DIAGNOSIS — D72.829 LEUKOCYTOSIS, UNSPECIFIED TYPE: ICD-10-CM

## 2024-10-21 DIAGNOSIS — D64.9 ANEMIA, UNSPECIFIED TYPE: ICD-10-CM

## 2024-10-21 PROCEDURE — A9552 F18 FDG: HCPCS

## 2024-10-21 PROCEDURE — 78816 PET IMAGE W/CT FULL BODY: CPT | Mod: PI

## 2024-10-21 PROCEDURE — 3430000001 HC RX 343 DIAGNOSTIC RADIOPHARMACEUTICALS

## 2024-10-21 RX ORDER — FLUDEOXYGLUCOSE F 18 200 MCI/ML
12.3 INJECTION, SOLUTION INTRAVENOUS
Status: COMPLETED | OUTPATIENT
Start: 2024-10-21 | End: 2024-10-21

## 2024-10-23 ENCOUNTER — TELEMEDICINE (OUTPATIENT)
Dept: HEMATOLOGY/ONCOLOGY | Facility: CLINIC | Age: 59
End: 2024-10-23
Payer: COMMERCIAL

## 2024-10-23 DIAGNOSIS — R61 NIGHT SWEATS: ICD-10-CM

## 2024-10-23 DIAGNOSIS — R53.83 FATIGUE, UNSPECIFIED TYPE: Primary | ICD-10-CM

## 2024-10-23 DIAGNOSIS — R16.2 HEPATOSPLENOMEGALY: ICD-10-CM

## 2024-10-23 DIAGNOSIS — R06.02 SHORTNESS OF BREATH: ICD-10-CM

## 2024-10-23 DIAGNOSIS — D61.818 PANCYTOPENIA: ICD-10-CM

## 2024-10-23 DIAGNOSIS — D64.9 ANEMIA, UNSPECIFIED TYPE: ICD-10-CM

## 2024-10-23 PROCEDURE — 99214 OFFICE O/P EST MOD 30 MIN: CPT
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
## 2024-11-13 ENCOUNTER — TELEPHONE (OUTPATIENT)
Dept: HEMATOLOGY/ONCOLOGY | Facility: CLINIC | Age: 59
End: 2024-11-13
Payer: COMMERCIAL

## 2024-11-13 NOTE — TELEPHONE ENCOUNTER
Went over all questions with patient, she had questions about where to go for the procedure, how long it takes, etc. No further questions at this time. Encouraged pt to call our office back should she have any further questions or concerns. Patient agreed to plan and verbalized understanding using teach back method.

## 2024-11-14 ENCOUNTER — HOSPITAL ENCOUNTER (OUTPATIENT)
Dept: RADIOLOGY | Facility: HOSPITAL | Age: 59
Discharge: HOME | End: 2024-11-14
Payer: COMMERCIAL

## 2024-11-14 VITALS
SYSTOLIC BLOOD PRESSURE: 114 MMHG | HEIGHT: 61 IN | OXYGEN SATURATION: 95 % | RESPIRATION RATE: 18 BRPM | TEMPERATURE: 98.1 F | HEART RATE: 67 BPM | BODY MASS INDEX: 40.59 KG/M2 | DIASTOLIC BLOOD PRESSURE: 73 MMHG | WEIGHT: 215 LBS

## 2024-11-14 DIAGNOSIS — R53.83 FATIGUE, UNSPECIFIED TYPE: ICD-10-CM

## 2024-11-14 DIAGNOSIS — R06.02 SHORTNESS OF BREATH: ICD-10-CM

## 2024-11-14 DIAGNOSIS — R16.2 HEPATOSPLENOMEGALY: ICD-10-CM

## 2024-11-14 DIAGNOSIS — R61 NIGHT SWEATS: ICD-10-CM

## 2024-11-14 DIAGNOSIS — D61.818 PANCYTOPENIA: ICD-10-CM

## 2024-11-14 LAB
BASOPHILS # BLD AUTO: 0.02 X10*3/UL (ref 0–0.1)
BASOPHILS NFR BLD AUTO: 0.4 %
EOSINOPHIL # BLD AUTO: 0 X10*3/UL (ref 0–0.7)
EOSINOPHIL NFR BLD AUTO: 0 %
ERYTHROCYTE [DISTWIDTH] IN BLOOD BY AUTOMATED COUNT: 16.2 % (ref 11.5–14.5)
HCT VFR BLD AUTO: 40.4 % (ref 36–46)
HGB BLD-MCNC: 12.9 G/DL (ref 12–16)
IMM GRANULOCYTES # BLD AUTO: 0.01 X10*3/UL (ref 0–0.7)
IMM GRANULOCYTES NFR BLD AUTO: 0.2 % (ref 0–0.9)
LYMPHOCYTES # BLD AUTO: 1.77 X10*3/UL (ref 1.2–4.8)
LYMPHOCYTES NFR BLD AUTO: 34.3 %
MCH RBC QN AUTO: 26.6 PG (ref 26–34)
MCHC RBC AUTO-ENTMCNC: 31.9 G/DL (ref 32–36)
MCV RBC AUTO: 83 FL (ref 80–100)
MONOCYTES # BLD AUTO: 0.48 X10*3/UL (ref 0.1–1)
MONOCYTES NFR BLD AUTO: 9.3 %
NEUTROPHILS # BLD AUTO: 2.88 X10*3/UL (ref 1.2–7.7)
NEUTROPHILS NFR BLD AUTO: 55.8 %
NRBC BLD-RTO: 0 /100 WBCS (ref 0–0)
PLATELET # BLD AUTO: 262 X10*3/UL (ref 150–450)
RBC # BLD AUTO: 4.85 X10*6/UL (ref 4–5.2)
WBC # BLD AUTO: 5.2 X10*3/UL (ref 4.4–11.3)

## 2024-11-14 PROCEDURE — 7100000010 HC PHASE TWO TIME - EACH INCREMENTAL 1 MINUTE

## 2024-11-14 PROCEDURE — 99152 MOD SED SAME PHYS/QHP 5/>YRS: CPT

## 2024-11-14 PROCEDURE — 2500000004 HC RX 250 GENERAL PHARMACY W/ HCPCS (ALT 636 FOR OP/ED): Performed by: STUDENT IN AN ORGANIZED HEALTH CARE EDUCATION/TRAINING PROGRAM

## 2024-11-14 PROCEDURE — 85025 COMPLETE CBC W/AUTO DIFF WBC: CPT

## 2024-11-14 PROCEDURE — 36415 COLL VENOUS BLD VENIPUNCTURE: CPT

## 2024-11-14 PROCEDURE — 77012 CT SCAN FOR NEEDLE BIOPSY: CPT

## 2024-11-14 PROCEDURE — 2720000007 HC OR 272 NO HCPCS

## 2024-11-14 PROCEDURE — 7100000009 HC PHASE TWO TIME - INITIAL BASE CHARGE

## 2024-11-14 PROCEDURE — C1830 POWER BONE MARROW BX NEEDLE: HCPCS

## 2024-11-14 PROCEDURE — 85097 BONE MARROW INTERPRETATION: CPT | Mod: GEALAB

## 2024-11-14 RX ORDER — FENTANYL CITRATE 50 UG/ML
INJECTION, SOLUTION INTRAMUSCULAR; INTRAVENOUS
Status: COMPLETED | OUTPATIENT
Start: 2024-11-14 | End: 2024-11-14

## 2024-11-14 RX ORDER — MIDAZOLAM HYDROCHLORIDE 1 MG/ML
INJECTION INTRAMUSCULAR; INTRAVENOUS
Status: COMPLETED | OUTPATIENT
Start: 2024-11-14 | End: 2024-11-14

## 2024-11-14 ASSESSMENT — PAIN SCALES - GENERAL
PAINLEVEL_OUTOF10: 0 - NO PAIN

## 2024-11-14 ASSESSMENT — PAIN - FUNCTIONAL ASSESSMENT: PAIN_FUNCTIONAL_ASSESSMENT: 0-10

## 2024-11-14 NOTE — POST-PROCEDURE NOTE
Interventional Radiology Brief Postprocedure Note    Attending: Narda Cervantes MD      Assistant: None    Diagnosis: pancytopenia    Description of procedure: The patient was placed in [prone] position on the interventional CT scanner bed. An initial  image and axial noncontrast CT images of the pelvis were obtained. Imaging findings are discussed below. A posterior percutaneous approach was chosen for biopsy of the  [left] iliac. The skin site was marked, prepped, and draped in usual sterile fashion. Local anesthesia of the skin and deep tissues was administered with 1% lidocaine.     An 11 gauge coaxial needle system was advanced to the iliac and seated within the bone. Initial marrow aspirate was obtained, which was found to contain spicules. Then, multiple additional aspirates were obtained in heparinized and non-heparinized syringes. Finally, core biopsy was performed through the coaxial system using a powered 13 gauge biopsy needle ([one core specimen obtained]). The needles were removed and sterile dressing applied.      Samples were deemed adequate by the bone marrow pathology team in the procedure area and taken for analysis.     Anesthesia:  MAC Moderate    Complications: None    Estimated Blood Loss: none    Medications (Filter: Administrations occurring from 1240 to 1304 on 11/14/24) As of 11/14/24 1304      fentaNYL PF (Sublimaze) injection (mcg) Total dose:  100 mcg      Date/Time Rate/Dose/Volume Action       11/14/24  1250 100 mcg Given               midazolam (Versed) injection (mg) Total dose:  1 mg      Date/Time Rate/Dose/Volume Action       11/14/24  1251 1 mg Given                   No specimens collected      See detailed result report with images in PACS.    The patient tolerated the procedure well without incident or complication and is in stable condition.

## 2024-11-14 NOTE — PRE-PROCEDURE NOTE
Interventional Radiology Preprocedure Note    Indication for procedure: Diagnoses of Pancytopenia, Hepatosplenomegaly, Fatigue, unspecified type, Night sweats, and Shortness of breath were pertinent to this visit.    Relevant review of systems:  Normal    Relevant Labs:   Lab Results   Component Value Date    CREATININE 0.59 09/04/2024    EGFR >90 09/04/2024    INR 1.1 09/19/2024    PROTIME 12.0 09/19/2024       Planned Sedation/Anesthesia: Moderate    Airway assessment: normal    Directed physical examination:    Normal    Mallampati: II (hard and soft palate, upper portion of tonsils and uvula visible)    ASA Score: ASA 2 - Patient with mild systemic disease with no functional limitations    Benefits, risks and alternatives of procedure and planned sedation have been discussed with the patient and/or their representative. All questions answered and they agree to proceed.

## 2024-11-14 NOTE — DISCHARGE INSTRUCTIONS
Okay to remove band-aid tomorrow  Okay to shower tomorrow  Okay to use ice as needed for pain  Okay to take tylenol as needed for pain  No driving for 24 hours  No drinking alcohol for 24 hours   No making any life changing decisions for 24 hours  Follow up with Carly Gaines in 2-4 weeks for results  Notify MD with any s/s of infection or active bleeding

## 2024-11-19 LAB
CELL COUNT (BLOOD): 23 X10*3/UL
CELL POPULATIONS: NORMAL
DIAGNOSIS: NORMAL
FLOW DIFFERENTIAL: NORMAL
FLOW TEST ORDERED: NORMAL
FLUID CELL COUNT: 64 /UL
LAB TEST METHOD: NORMAL
NUMBER OF CELLS COLLECTED: NORMAL PER TUBE
PATH REPORT.COMMENTS IMP SPEC: NORMAL
PATH REPORT.FINAL DX SPEC: NORMAL
PATH REPORT.GROSS SPEC: NORMAL
PATH REPORT.MICROSCOPIC SPEC OTHER STN: NORMAL
PATH REPORT.RELEVANT HX SPEC: NORMAL
PATH REPORT.TOTAL CANCER: NORMAL
PATH REPORT.TOTAL CANCER: NORMAL
SIGNATURE COMMENT: NORMAL
SPECIMEN VIABILITY: NORMAL

## 2024-12-03 ENCOUNTER — OFFICE VISIT (OUTPATIENT)
Dept: HEMATOLOGY/ONCOLOGY | Facility: CLINIC | Age: 59
End: 2024-12-03
Payer: COMMERCIAL

## 2024-12-03 VITALS
BODY MASS INDEX: 40.66 KG/M2 | HEART RATE: 83 BPM | TEMPERATURE: 97.3 F | DIASTOLIC BLOOD PRESSURE: 77 MMHG | SYSTOLIC BLOOD PRESSURE: 114 MMHG | RESPIRATION RATE: 17 BRPM | OXYGEN SATURATION: 97 % | WEIGHT: 215.17 LBS

## 2024-12-03 DIAGNOSIS — R06.02 SHORTNESS OF BREATH: ICD-10-CM

## 2024-12-03 DIAGNOSIS — R61 NIGHT SWEATS: ICD-10-CM

## 2024-12-03 DIAGNOSIS — D61.818 PANCYTOPENIA: ICD-10-CM

## 2024-12-03 DIAGNOSIS — R53.83 FATIGUE, UNSPECIFIED TYPE: ICD-10-CM

## 2024-12-03 DIAGNOSIS — R16.2 HEPATOSPLENOMEGALY: ICD-10-CM

## 2024-12-03 PROCEDURE — 99214 OFFICE O/P EST MOD 30 MIN: CPT | Performed by: INTERNAL MEDICINE

## 2024-12-03 SDOH — ECONOMIC STABILITY: FOOD INSECURITY: WITHIN THE PAST 12 MONTHS, THE FOOD YOU BOUGHT JUST DIDN'T LAST AND YOU DIDN'T HAVE MONEY TO GET MORE.: NEVER TRUE

## 2024-12-03 SDOH — ECONOMIC STABILITY: FOOD INSECURITY: WITHIN THE PAST 12 MONTHS, YOU WORRIED THAT YOUR FOOD WOULD RUN OUT BEFORE YOU GOT THE MONEY TO BUY MORE.: NEVER TRUE

## 2024-12-03 ASSESSMENT — COLUMBIA-SUICIDE SEVERITY RATING SCALE - C-SSRS
1. IN THE PAST MONTH, HAVE YOU WISHED YOU WERE DEAD OR WISHED YOU COULD GO TO SLEEP AND NOT WAKE UP?: NO
2. HAVE YOU ACTUALLY HAD ANY THOUGHTS OF KILLING YOURSELF?: NO
6. HAVE YOU EVER DONE ANYTHING, STARTED TO DO ANYTHING, OR PREPARED TO DO ANYTHING TO END YOUR LIFE?: NO

## 2024-12-03 ASSESSMENT — PAIN SCALES - GENERAL: PAINLEVEL_OUTOF10: 0-NO PAIN

## 2024-12-03 NOTE — PROGRESS NOTES
Patient ID: Irina Kennedy is a 59 y.o. female.  Referring Physician: No referring provider defined for this encounter.  Primary Care Provider: João Bhakta DO  Visit Type:  Initial Visit       Subjective    HPI  A, B & C. BONE MARROW CLOT WITH ASPIRATE AND CORE WITH TOUCH PREP, LEFT ILIAC CREST:       --LIMITED BONE MARROW SPECIMEN DEMONSTRATING A NORMOCELLULAR BONE MARROW WITH NORMAL MATURING TRILINEAGE HEMATOPOIESIS, SEE NOTE.  --FOCAL MARROW INVOLVEMENT BY CLONAL LAMBDA+ B-CELL PROCESS, SEE NOTE  --ADEQUATE IRON STORES.     Note: The bone marrow sample is limited for evaluation due to paucispicular aspirate smears and marked hemorrhage and aspiration artifacts in the core biopsy. Several small lymphoid aggregates are noted on the core biopsy that are T-cell rich with a few admixed B-cells. By flow cytometry a very small clonal lambda+ B-cell population was detected. The B cells were not able to be fully characterized by immunostains or flow cytometry due to the low percentage of cells detected. 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 Low level involvement by a systemic lymphoma cannot be excluded.   Review of Systems   Constitutional: Negative.    HENT:  Negative.     Respiratory: Negative.     Cardiovascular: Negative.    Gastrointestinal: Negative.       Note: The B cell population detected was very small and likely incidental representing a monoclonal B cell lymphocytosis. The cells were not fully characterized due to the low percentage detected. Low level marrow involvement by a systemic lymphoma cannot be totally excluded. Clinical and morphologic correlation is suggested.     IMPRESSION:  1. No FDG avid zaid or extranodal disease to indicate a neoplastic process.  2. Mild hepatosplenomegaly.  Signed by: Lux Weems 10/21/2024    CT SCAN: IMPRESSION:  1. New hepatosplenomegaly, for which  underlying lymphomatous process is not excluded. No lymphadenopathy within the chest, abdomen or pelvis. 2. Right middle lobe 3 mm nodule, follow-up CT chest is recommended in 1 year.  3. Chronic and incidental findings as detailed above.  Signed by: Manny Jones 2024    Objective   BSA: There is no height or weight on file to calculate BSA.  There were no vitals taken for this visit.     has a past medical history of Personal history of other diseases of the circulatory system and Personal history of other diseases of the nervous system and sense organs (2013).    She has no past medical history of Adverse effect of anesthesia, Arthritis, Asthma, Awareness under anesthesia, CHF (congestive heart failure), COPD (chronic obstructive pulmonary disease) (Multi), Coronary artery disease, Delayed emergence from general anesthesia, Diabetes mellitus (Multi), Disease of thyroid gland, Hard to intubate, History of transfusion, Hypertension, Malignant hyperthermia, PONV (postoperative nausea and vomiting), Pseudocholinesterase deficiency, Spinal headache, or Stroke (Multi).   has a past surgical history that includes  section, classic (2013); Tonsillectomy (2013); Sinus surgery (2013); Cholecystectomy (08/15/2020); Gastric restriction surgery (); Other surgical history (2019); Rotator cuff repair (2016); Other surgical history (08/15/2020); Other surgical history (2017); Other surgical history (2014); Hernia repair; and Eye surgery.  Family History   Problem Relation Name Age of Onset    Breast cancer Mother      Diabetes Mother      Stroke Mother      Heart disease Mother      Heart attack Mother      Osteoporosis Mother      Cancer Father      Hypertension Father      Stroke Father      Heart disease Father      Osteoporosis Father      Breast cancer Sister      Diabetes Maternal Grandmother       Oncology History    No history exists.       Irina Kennedy   reports that she has never smoked. She has never used smokeless tobacco.  She  reports current alcohol use of about 2.0 standard drinks of alcohol per week.  She  reports no history of drug use.    Physical Exam  Constitutional:       Appearance: Normal appearance.   HENT:      Head: Normocephalic and atraumatic.   Eyes:      Extraocular Movements: Extraocular movements intact.      Pupils: Pupils are equal, round, and reactive to light.   Neurological:      Mental Status: She is alert.         WBC   Date/Time Value Ref Range Status   11/14/2024 01:09 PM 5.2 4.4 - 11.3 x10*3/uL Final   09/19/2024 07:52 AM 3.6 (L) 4.4 - 11.3 x10*3/uL Final   09/04/2024 09:49 AM 3.7 (L) 4.4 - 11.3 x10*3/uL Final     nRBC   Date Value Ref Range Status   11/14/2024 0.0 0.0 - 0.0 /100 WBCs Final   09/19/2024   Final     Comment:     Not Measured   09/04/2024   Final     Comment:     Not Measured     RBC   Date Value Ref Range Status   11/14/2024 4.85 4.00 - 5.20 x10*6/uL Final   09/19/2024 4.24 4.00 - 5.20 x10*6/uL Final   09/04/2024 4.35 4.00 - 5.20 x10*6/uL Final     Hemoglobin   Date Value Ref Range Status   11/14/2024 12.9 12.0 - 16.0 g/dL Final   09/19/2024 10.8 (L) 12.0 - 16.0 g/dL Final   09/04/2024 11.1 (L) 12.0 - 16.0 g/dL Final     Hematocrit   Date Value Ref Range Status   11/14/2024 40.4 36.0 - 46.0 % Final   09/19/2024 33.9 (L) 36.0 - 46.0 % Final   09/04/2024 35.8 (L) 36.0 - 46.0 % Final     MCV   Date/Time Value Ref Range Status   11/14/2024 01:09 PM 83 80 - 100 fL Final   09/19/2024 07:52 AM 80 80 - 100 fL Final   09/04/2024 09:49 AM 82 80 - 100 fL Final     MCH   Date/Time Value Ref Range Status   11/14/2024 01:09 PM 26.6 26.0 - 34.0 pg Final   09/19/2024 07:52 AM 25.5 (L) 26.0 - 34.0 pg Final   09/04/2024 09:49 AM 25.5 (L) 26.0 - 34.0 pg Final     MCHC   Date/Time Value Ref Range Status   11/14/2024 01:09 PM 31.9 (L) 32.0 - 36.0 g/dL Final   09/19/2024 07:52 AM 31.9 (L) 32.0 - 36.0 g/dL Final   09/04/2024 09:49 AM 31.0  "(L) 32.0 - 36.0 g/dL Final     RDW   Date/Time Value Ref Range Status   11/14/2024 01:09 PM 16.2 (H) 11.5 - 14.5 % Final   09/19/2024 07:52 AM 16.2 (H) 11.5 - 14.5 % Final   09/04/2024 09:49 AM 17.1 (H) 11.5 - 14.5 % Final     Platelets   Date/Time Value Ref Range Status   11/14/2024 01:09  150 - 450 x10*3/uL Final   09/19/2024 07:52  150 - 450 x10*3/uL Final   09/04/2024 09:49  150 - 450 x10*3/uL Final     No results found for: \"MPV\"  Neutrophils %   Date/Time Value Ref Range Status   11/14/2024 01:09 PM 55.8 40.0 - 80.0 % Final   09/19/2024 07:52 AM 60.9 40.0 - 80.0 % Final   09/04/2024 09:49 AM 57.6 40.0 - 80.0 % Final     Immature Granulocytes %, Automated   Date/Time Value Ref Range Status   11/14/2024 01:09 PM 0.2 0.0 - 0.9 % Final     Comment:     Immature Granulocyte Count (IG) includes promyelocytes, myelocytes and metamyelocytes but does not include bands. Percent differential counts (%) should be interpreted in the context of the absolute cell counts (cells/UL).   09/19/2024 07:52 AM 0.3 0.0 - 0.9 % Final     Comment:     Immature Granulocyte Count (IG) includes promyelocytes, myelocytes and metamyelocytes but does not include bands. Percent differential counts (%) should be interpreted in the context of the absolute cell counts (cells/UL).   09/04/2024 09:49 AM 0.3 0.0 - 0.9 % Final     Comment:     Immature Granulocyte Count (IG) includes promyelocytes, myelocytes and metamyelocytes but does not include bands. Percent differential counts (%) should be interpreted in the context of the absolute cell counts (cells/UL).     Lymphocytes %   Date/Time Value Ref Range Status   11/14/2024 01:09 PM 34.3 13.0 - 44.0 % Final   09/19/2024 07:52 AM 24.2 13.0 - 44.0 % Final   09/04/2024 09:49 AM 29.4 13.0 - 44.0 % Final     Monocytes %   Date/Time Value Ref Range Status   11/14/2024 01:09 PM 9.3 2.0 - 10.0 % Final   09/19/2024 07:52 AM 14.3 2.0 - 10.0 % Final   09/04/2024 09:49 AM 12.4 2.0 - 10.0 % " Final     Eosinophils %   Date/Time Value Ref Range Status   11/14/2024 01:09 PM 0.0 0.0 - 6.0 % Final   09/19/2024 07:52 AM 0.0 0.0 - 6.0 % Final   09/04/2024 09:49 AM 0.0 0.0 - 6.0 % Final     Basophils %   Date/Time Value Ref Range Status   11/14/2024 01:09 PM 0.4 0.0 - 2.0 % Final   09/19/2024 07:52 AM 0.3 0.0 - 2.0 % Final   09/04/2024 09:49 AM 0.3 0.0 - 2.0 % Final     Neutrophils Absolute   Date/Time Value Ref Range Status   11/14/2024 01:09 PM 2.88 1.20 - 7.70 x10*3/uL Final     Comment:     Percent differential counts (%) should be interpreted in the context of the absolute cell counts (cells/uL).   09/19/2024 07:52 AM 2.22 1.20 - 7.70 x10*3/uL Final     Comment:     Percent differential counts (%) should be interpreted in the context of the absolute cell counts (cells/uL).   09/04/2024 09:49 AM 2.14 1.20 - 7.70 x10*3/uL Final     Comment:     Percent differential counts (%) should be interpreted in the context of the absolute cell counts (cells/uL).     Immature Granulocytes Absolute, Automated   Date/Time Value Ref Range Status   11/14/2024 01:09 PM 0.01 0.00 - 0.70 x10*3/uL Final   09/19/2024 07:52 AM 0.01 0.00 - 0.70 x10*3/uL Final   09/04/2024 09:49 AM 0.01 0.00 - 0.70 x10*3/uL Final     Lymphocytes Absolute   Date/Time Value Ref Range Status   11/14/2024 01:09 PM 1.77 1.20 - 4.80 x10*3/uL Final   09/19/2024 07:52 AM 0.88 (L) 1.20 - 4.80 x10*3/uL Final   09/04/2024 09:49 AM 1.09 (L) 1.20 - 4.80 x10*3/uL Final     Monocytes Absolute   Date/Time Value Ref Range Status   11/14/2024 01:09 PM 0.48 0.10 - 1.00 x10*3/uL Final   09/19/2024 07:52 AM 0.52 0.10 - 1.00 x10*3/uL Final   09/04/2024 09:49 AM 0.46 0.10 - 1.00 x10*3/uL Final     Eosinophils Absolute   Date/Time Value Ref Range Status   11/14/2024 01:09 PM 0.00 0.00 - 0.70 x10*3/uL Final   09/19/2024 07:52 AM 0.00 0.00 - 0.70 x10*3/uL Final   09/04/2024 09:49 AM 0.00 0.00 - 0.70 x10*3/uL Final     Basophils Absolute   Date/Time Value Ref Range Status  "  11/14/2024 01:09 PM 0.02 0.00 - 0.10 x10*3/uL Final   09/19/2024 07:52 AM 0.01 0.00 - 0.10 x10*3/uL Final   09/04/2024 09:49 AM 0.01 0.00 - 0.10 x10*3/uL Final       No components found for: \"PT\"  aPTT   Date/Time Value Ref Range Status   09/19/2024 07:52 AM 26 (L) 27 - 38 seconds Final   FLOW CYTOMETRY:  --Small clonal lambda+ B cell population detected (less than 0.1%), see note.   --No increased or abnormal blast population.   --No abnormality of granulocytes or monocytes, see note.     Note: The B cell population detected was very small and likely incidental representing a monoclonal B cell lymphocytosis. The cells were not fully characterized due to the low percentage detected. Low level marrow involvement by a systemic lymphoma cannot be totally excluded.    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  Review of PET scan with conference together with the CT scan and new hepatosplenomegaly with some degree of PET avidity suggesting a lymphoproliferative disorder.  Direct antiglobulin test which is negative suggesting that this is not autoimmune hemolysis.    Evaluation and review of ferritin assay does not give any evidence of iron deficiency.  There is no indication at this time to treat the monoclonal B-cell lymphocytosis at this time.  Will continue to observe and monitor CBC every 6 months and if there are abnormalities in regard to cytopenias I will recommend anti-CD20 therapy for the B-cell lymphocytosis.  The etiology of the B-cell lymphocytosis remains elusive.  IgG IgA and IgM is ordered.  SPEP is negative for evidence of a monoclonal protein.  Light chain assay is normal.    No actionable malignant heme diagnosis.  Patient with a history of Mario-en-Y bariatric surgery.  Patient referred back to primary hematologist to continue monitoring hematology/iron levels with episodic replacement as necessary.  Only if cytopenias worsen should patient be " ready referred for evaluation.     Diagnoses and all orders for this visit:  Pancytopenia  -     Clinic Appointment Request Follow Up (DR JOSÉ MIGUEL ANDREWSBX)  -     Clinic Appointment Request Follow Up (with RM); Future  -     CBC and Auto Differential; Standing  Hepatosplenomegaly  -     Clinic Appointment Request Follow Up (DR VALDEZ BMBX)  Fatigue, unspecified type  -     Clinic Appointment Request Follow Up (DR JOSÉ MIGUEL ANDREWSBX)  Night sweats  -     Clinic Appointment Request Follow Up (DR JOSÉ MIGUEL ANDREWSBX)  Shortness of breath  -     Clinic Appointment Request Follow Up (DR JOSÉ MIGUEL BERMUDEZX)           Zoila Valdez MD

## 2024-12-03 NOTE — PATIENT INSTRUCTIONS
Today you met with your hematologist/oncologist.  Recent labs were discussed and questions answered.  Scheduling orders were placed.  While we appreciate that you verbalized understanding, if any questions arise after leaving, please do not hesitate to call the office to discuss.  [phone] Tan Castrejon  You do not need to follow up with Dr Houser at this time. Carly will continue to see you, monitor your labs and order iron as needed. She will see you next in 3 months.

## 2024-12-04 ASSESSMENT — ENCOUNTER SYMPTOMS
RESPIRATORY NEGATIVE: 1
CONSTITUTIONAL NEGATIVE: 1
GASTROINTESTINAL NEGATIVE: 1
CARDIOVASCULAR NEGATIVE: 1

## 2025-01-17 ENCOUNTER — TELEPHONE (OUTPATIENT)
Dept: SLEEP MEDICINE | Facility: HOSPITAL | Age: 60
End: 2025-01-17
Payer: COMMERCIAL

## 2025-01-17 DIAGNOSIS — G47.33 OSA ON CPAP: Primary | ICD-10-CM

## 2025-01-31 LAB
BAND RESOLUTION: 400 BANDS
CHROM ANALY OVERALL INTERP-IMP: NORMAL
CHROMOSOME ANALYSIS CELLS ANALYZED: 20 CELLS
CHROMOSOME ANALYSIS CELLS IMAGED: 4 CELLS
CHROMOSOME ANALYSIS HYPERMODAL CELL COUNT: 0 CELLS
CHROMOSOME ANALYSIS HYPOMODAL CELL COUNT: 0 CELLS
CHROMOSOME ANALYSIS MODAL CHROMOSOME NO: 46 CHROMOSOMES
CHROMOSOME ANALYSIS STAINING METHOD: NORMAL
ELECTRONICALLY SIGNED BY CYTOGENETICS: NORMAL
KARYOTYP MAR: 2 CELLS
TOTAL CELLS COUNTED MAR: 20 CELLS

## 2025-01-31 PROCEDURE — 88264 CHROMOSOME ANALYSIS 20-25: CPT

## 2025-02-08 LAB
PATH REPORT.ADDENDUM SPEC: NORMAL
PATH REPORT.COMMENTS IMP SPEC: NORMAL
PATH REPORT.FINAL DX SPEC: NORMAL
PATH REPORT.GROSS SPEC: NORMAL
PATH REPORT.MICROSCOPIC SPEC OTHER STN: NORMAL
PATH REPORT.RELEVANT HX SPEC: NORMAL
PATH REPORT.TOTAL CANCER: NORMAL

## 2025-02-09 DIAGNOSIS — D61.818 PANCYTOPENIA: Primary | ICD-10-CM

## 2025-02-09 DIAGNOSIS — R16.2 HEPATOSPLENOMEGALY: ICD-10-CM

## 2025-02-11 NOTE — TELEPHONE ENCOUNTER
PAP order placed. Pls fax to DME. I ordered to lower the pressure settings. If she still has bloating, consider titration study with BPAP. Alternatively, see GI

## 2025-03-03 ENCOUNTER — TELEPHONE (OUTPATIENT)
Dept: OBSTETRICS AND GYNECOLOGY | Facility: CLINIC | Age: 60
End: 2025-03-03
Payer: COMMERCIAL

## 2025-03-03 ENCOUNTER — APPOINTMENT (OUTPATIENT)
Dept: HEMATOLOGY/ONCOLOGY | Facility: CLINIC | Age: 60
End: 2025-03-03
Payer: COMMERCIAL

## 2025-03-03 NOTE — TELEPHONE ENCOUNTER
Patient called. Stopped Estradiol cream a couple months ago. Hasn't had sex in 1 1/2 months. Very painful, used lubricant. Going in painful. I discussed that the combination of abstaining and stopping the Estradiol cream likely made things more uncomfortable for her. She will resume the cream. Will schedule annual for April.

## 2025-03-06 DIAGNOSIS — N89.8 VAGINAL DRYNESS: ICD-10-CM

## 2025-03-06 RX ORDER — ESTRADIOL 0.1 MG/G
0.5 CREAM VAGINAL DAILY
Qty: 42.5 G | Refills: 0 | Status: SHIPPED | OUTPATIENT
Start: 2025-03-06

## 2025-03-06 RX ORDER — ESTRADIOL 0.1 MG/G
4 CREAM VAGINAL DAILY
Qty: 42.5 G | Refills: 0 | Status: SHIPPED | OUTPATIENT
Start: 2025-03-06 | End: 2025-03-06 | Stop reason: ENTERED-IN-ERROR

## 2025-03-06 RX ORDER — ESTRADIOL 0.1 MG/G
2 CREAM VAGINAL NIGHTLY
Qty: 34 G | Refills: 3 | Status: SHIPPED | OUTPATIENT
Start: 2025-03-06 | End: 2026-03-06

## 2025-03-06 NOTE — TELEPHONE ENCOUNTER
"Called the pt to triage symptoms.    The pt states that they have been experiencing lower left abdominal pain, intermittently to constant since with no improvement, since Friday. Pt describes it as both painful and tender, worse with palpation. Rates pain, ranging from a 3 to a 9. Stats, \"I felt crappy all weekend.\" Pt states that they felt \"okay\" during the time of our call, but she is unsure that it will last.    Pt is now experiencing lower back pain. Pt states that her albuterol sometimes leads to some lower back pain and is not fully confident on the origin. Has been taking ibuprofen for some of the pain.    Pt admits to some nausea and chills on Saturday, but not since.    Pt states that she usually has 2-3 bowel movements daily, currently is experiencing 0-1. Pt has had to bear down with BM. Pt took Senna on Saturday with mild improvement.    Denies light-headedness, abnormal headaches, fever, blood in stool, urinary discomfort or pain.    Spoke with Isabella Triplett PA-C, requested that the pt come in at 1pm if it was acceptable to the pt. The pt agreed to come in at 1pm, versus their original 3:30pm appt.    - Parmjit \"Keith\" DORIS Arthur  Triage [facility]    " Both orders placed.

## 2025-03-13 ENCOUNTER — LAB (OUTPATIENT)
Dept: LAB | Facility: HOSPITAL | Age: 60
End: 2025-03-13
Payer: COMMERCIAL

## 2025-03-13 DIAGNOSIS — D64.9 ANEMIA, UNSPECIFIED TYPE: ICD-10-CM

## 2025-03-13 DIAGNOSIS — D72.829 ELEVATED WHITE BLOOD CELL COUNT, UNSPECIFIED: ICD-10-CM

## 2025-03-13 DIAGNOSIS — D64.9 ANEMIA, UNSPECIFIED: Primary | ICD-10-CM

## 2025-03-13 DIAGNOSIS — D72.829 LEUKOCYTOSIS, UNSPECIFIED TYPE: ICD-10-CM

## 2025-03-13 LAB
BASOPHILS # BLD AUTO: 0.02 X10*3/UL (ref 0–0.1)
BASOPHILS NFR BLD AUTO: 0.3 %
EOSINOPHIL # BLD AUTO: 0.13 X10*3/UL (ref 0–0.7)
EOSINOPHIL NFR BLD AUTO: 2.1 %
ERYTHROCYTE [DISTWIDTH] IN BLOOD BY AUTOMATED COUNT: 13.3 % (ref 11.5–14.5)
FERRITIN SERPL-MCNC: 253 NG/ML (ref 8–150)
HAPTOGLOB SERPL NEPH-MCNC: 91 MG/DL (ref 30–200)
HCT VFR BLD AUTO: 40.8 % (ref 36–46)
HGB BLD-MCNC: 13.4 G/DL (ref 12–16)
HOLD SPECIMEN: NORMAL
IMM GRANULOCYTES # BLD AUTO: 0.02 X10*3/UL (ref 0–0.7)
IMM GRANULOCYTES NFR BLD AUTO: 0.3 % (ref 0–0.9)
IRON SATN MFR SERPL: 14 % (ref 25–45)
IRON SERPL-MCNC: 54 UG/DL (ref 35–150)
LYMPHOCYTES # BLD AUTO: 1.72 X10*3/UL (ref 1.2–4.8)
LYMPHOCYTES NFR BLD AUTO: 27.1 %
MCH RBC QN AUTO: 28.6 PG (ref 26–34)
MCHC RBC AUTO-ENTMCNC: 32.8 G/DL (ref 32–36)
MCV RBC AUTO: 87 FL (ref 80–100)
MONOCYTES # BLD AUTO: 0.54 X10*3/UL (ref 0.1–1)
MONOCYTES NFR BLD AUTO: 8.5 %
NEUTROPHILS # BLD AUTO: 3.91 X10*3/UL (ref 1.2–7.7)
NEUTROPHILS NFR BLD AUTO: 61.7 %
NRBC BLD-RTO: 0 /100 WBCS (ref 0–0)
PLATELET # BLD AUTO: 281 X10*3/UL (ref 150–450)
RBC # BLD AUTO: 4.68 X10*6/UL (ref 4–5.2)
TIBC SERPL-MCNC: 376 UG/DL (ref 240–445)
UIBC SERPL-MCNC: 322 UG/DL (ref 110–370)
WBC # BLD AUTO: 6.3 X10*3/UL (ref 4.4–11.3)

## 2025-03-13 PROCEDURE — 83540 ASSAY OF IRON: CPT

## 2025-03-13 PROCEDURE — 83550 IRON BINDING TEST: CPT

## 2025-03-13 PROCEDURE — 85025 COMPLETE CBC W/AUTO DIFF WBC: CPT

## 2025-03-13 PROCEDURE — 82728 ASSAY OF FERRITIN: CPT

## 2025-03-13 PROCEDURE — 83010 ASSAY OF HAPTOGLOBIN QUANT: CPT

## 2025-03-13 PROCEDURE — 36415 COLL VENOUS BLD VENIPUNCTURE: CPT

## 2025-03-14 ENCOUNTER — APPOINTMENT (OUTPATIENT)
Dept: HEMATOLOGY/ONCOLOGY | Facility: CLINIC | Age: 60
End: 2025-03-14
Payer: COMMERCIAL

## 2025-04-04 ENCOUNTER — OFFICE VISIT (OUTPATIENT)
Dept: HEMATOLOGY/ONCOLOGY | Facility: CLINIC | Age: 60
End: 2025-04-04
Payer: COMMERCIAL

## 2025-04-04 ENCOUNTER — TELEPHONE (OUTPATIENT)
Dept: HEMATOLOGY/ONCOLOGY | Facility: CLINIC | Age: 60
End: 2025-04-04

## 2025-04-04 VITALS
TEMPERATURE: 96.8 F | SYSTOLIC BLOOD PRESSURE: 105 MMHG | OXYGEN SATURATION: 96 % | WEIGHT: 222 LBS | DIASTOLIC BLOOD PRESSURE: 68 MMHG | HEART RATE: 78 BPM | RESPIRATION RATE: 16 BRPM | BODY MASS INDEX: 41.95 KG/M2

## 2025-04-04 DIAGNOSIS — R91.1 LUNG NODULE: ICD-10-CM

## 2025-04-04 DIAGNOSIS — E53.8 B12 DEFICIENCY: Primary | ICD-10-CM

## 2025-04-04 DIAGNOSIS — E53.8 B12 DEFICIENCY: ICD-10-CM

## 2025-04-04 DIAGNOSIS — D61.818 PANCYTOPENIA: ICD-10-CM

## 2025-04-04 PROCEDURE — 99213 OFFICE O/P EST LOW 20 MIN: CPT

## 2025-04-04 PROCEDURE — 1036F TOBACCO NON-USER: CPT

## 2025-04-04 PROCEDURE — 99203 OFFICE O/P NEW LOW 30 MIN: CPT

## 2025-04-04 RX ORDER — FOLIC ACID 1 MG/1
1 TABLET ORAL DAILY
Qty: 30 TABLET | Refills: 2 | Status: SHIPPED | OUTPATIENT
Start: 2025-04-04 | End: 2026-04-04

## 2025-04-04 RX ORDER — ISOPROPYL ALCOHOL 70 ML/100ML
1 SWAB TOPICAL WEEKLY
Qty: 1 EACH | Refills: 2 | Status: SHIPPED | OUTPATIENT
Start: 2025-04-04

## 2025-04-04 RX ORDER — FERROUS SULFATE 15 MG/ML
15 DROPS ORAL 2 TIMES WEEKLY
Qty: 30 ML | Refills: 2 | Status: SHIPPED | OUTPATIENT
Start: 2025-04-07

## 2025-04-04 RX ORDER — NEEDLES, DISPOSABLE 25GX5/8"
NEEDLE, DISPOSABLE MISCELLANEOUS
Qty: 10 EACH | Refills: 2 | Status: SHIPPED | OUTPATIENT
Start: 2025-04-04 | End: 2025-04-04 | Stop reason: ALTCHOICE

## 2025-04-04 ASSESSMENT — PAIN SCALES - GENERAL: PAINLEVEL_OUTOF10: 0-NO PAIN
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
## 2025-04-08 ENCOUNTER — TELEPHONE (OUTPATIENT)
Dept: HEMATOLOGY/ONCOLOGY | Facility: CLINIC | Age: 60
End: 2025-04-08
Payer: COMMERCIAL

## 2025-04-09 NOTE — TELEPHONE ENCOUNTER
Called and spoke with pharmacist. Shivani GRIJALVA confirmed it is ok to substitute with childrens drops.

## 2025-04-10 ENCOUNTER — TELEPHONE (OUTPATIENT)
Dept: SLEEP MEDICINE | Facility: CLINIC | Age: 60
End: 2025-04-10
Payer: COMMERCIAL

## 2025-04-10 NOTE — TELEPHONE ENCOUNTER
"Spoke with patient and she is having continued problems after using CPAP, predominately bloating, feeling full, and belching. Patient states every time she uses the CPAP for several days \"I blow up like a balloon and it takes 3-5 days to go away\". Patient states this did not happen to her when she was on her old Jason PAP.  Patient reluctant to do another sleep study at this time due to financial concerns.  Would like to know if their are aother adjustments that can be made to current PAP machine.  "

## 2025-04-17 NOTE — PROGRESS NOTES
Subjective   Patient ID: Irina Kennedy is a 60 y.o. female who presents for Annual Exam.    PAP 4-19-24 NEG NEG; 10-11-18 NEG HPV NEG, 8-31-17 NEG, 5-6-16 NEG HPV NEG, 1-23-15 LSIL HPV POS, Colpo 2-23-15 LSIL  MAMMO 6-23-24  DEXA NEVER  COLON 10-24-18 polyp back in 5 years. Dad passed colon ca. Did Cologuard 6-27-23      Had an injured right shoulder. Arthritis.     Mom passed last year, breast primary but metastatic.  Twins turned 16. Son with autism is not driving yet.     Vomiting after meals. Had endoscopy, hiatal hernia. Had surgery. Got massive infection** with slow recovery.  She is post-menopausal, menopause reached at age 50. [place] autistic at [employer]. Daughter doing well.  Menopausal symptoms include hot flashes. She reports they are manageable and not affecting her daily living.   Denies any post-menopausal vaginal bleeding.  She denies any breast changes.   She is currently sexually active. Reports dyspareunia due to vaginal dryness. Tried Imvexxy last time. . Doesn't like estrace. Interested in the ring.  Denies abnormal vaginal discharge, itching, or odor.  Denies pelvic pain.   Saw Dr. Culver for mixed incontinence. Doing Botox with Dr. Culver. Does every 3 months. Hasn't had to go in a year.  Denies bowel concerns.  She is a non-smoker.   Medical hx. significant for bariatric surgery in 2004, cholecystectomy in 1994, depression, arthritis.  Mom passed breast ca.  Family h/o colon cancer -dad, passed.  Working part time and interviewing.       Review of Systems   Genitourinary:  Positive for dyspareunia.   Musculoskeletal:  Positive for back pain.   All other systems reviewed and are negative.      Objective   Constitutional: Alert and in no acute distress. Well developed, well nourished.  Neck: no neck asymmetry. Supple and thyroid not enlarged and there were no palpable thyroid nodules.  Chest: Breasts normal appearance, no nipple discharge and no skin changes and palpation of  breasts and axillae: no palpable mass and no axillary lymphadenopathy.  Abdomen: soft nontender; no abdominal mass palpated, no organomegaly and no hernias.  Genitourinary: external genitalia: normal, no inguinal lymphadenopathy, Bartholin's urethral and [place]'s glands: normal, urethra: normal and bladder: normal on palpation.  Vagina: normal.  Cervix: normal.  Uterus: normal.   Right adnexa/parametria: normal.  Left adnexa/parametria: normal.  Skin: normal skin color and pigmentation, normal skin turgor and no rash.  Psychiatric: alert and oriented x 3, affect normal to patient baseline and mood appropriate.     Assessment/Plan   -no pap  -jairo-sid  -Plans to do colonoscopy  -Change to estring

## 2025-04-21 ENCOUNTER — APPOINTMENT (OUTPATIENT)
Dept: OBSTETRICS AND GYNECOLOGY | Facility: CLINIC | Age: 60
End: 2025-04-21
Payer: COMMERCIAL

## 2025-04-21 VITALS
DIASTOLIC BLOOD PRESSURE: 64 MMHG | SYSTOLIC BLOOD PRESSURE: 116 MMHG | HEIGHT: 60 IN | BODY MASS INDEX: 43.98 KG/M2 | WEIGHT: 224 LBS

## 2025-04-21 DIAGNOSIS — Z01.419 WELL WOMAN EXAM WITH ROUTINE GYNECOLOGICAL EXAM: ICD-10-CM

## 2025-04-21 DIAGNOSIS — Z12.31 ENCOUNTER FOR SCREENING MAMMOGRAM FOR BREAST CANCER: Primary | ICD-10-CM

## 2025-04-21 DIAGNOSIS — N89.8 VAGINAL DRYNESS: ICD-10-CM

## 2025-04-21 PROCEDURE — 1036F TOBACCO NON-USER: CPT | Performed by: OBSTETRICS & GYNECOLOGY

## 2025-04-21 PROCEDURE — 99396 PREV VISIT EST AGE 40-64: CPT | Performed by: OBSTETRICS & GYNECOLOGY

## 2025-04-21 PROCEDURE — 3008F BODY MASS INDEX DOCD: CPT | Performed by: OBSTETRICS & GYNECOLOGY

## 2025-04-21 RX ORDER — ESTRADIOL 2 MG/1
2 SYSTEM VAGINAL
Qty: 1 EACH | Refills: 3 | Status: SHIPPED | OUTPATIENT
Start: 2025-04-21 | End: 2026-04-21

## 2025-04-21 ASSESSMENT — ENCOUNTER SYMPTOMS: BACK PAIN: 1

## 2025-06-03 DIAGNOSIS — F32.A DEPRESSION, UNSPECIFIED DEPRESSION TYPE: ICD-10-CM

## 2025-06-03 RX ORDER — BUPROPION HYDROCHLORIDE 300 MG/1
300 TABLET ORAL EVERY MORNING
Qty: 30 TABLET | Refills: 0 | Status: SHIPPED | OUTPATIENT
Start: 2025-06-03

## 2025-06-16 ENCOUNTER — APPOINTMENT (OUTPATIENT)
Dept: RADIOLOGY | Facility: HOSPITAL | Age: 60
End: 2025-06-16
Payer: COMMERCIAL

## 2025-06-16 VITALS — BODY MASS INDEX: 43.98 KG/M2 | WEIGHT: 224 LBS | HEIGHT: 60 IN

## 2025-06-16 DIAGNOSIS — Z12.31 ENCOUNTER FOR SCREENING MAMMOGRAM FOR BREAST CANCER: ICD-10-CM

## 2025-06-16 PROCEDURE — 77063 BREAST TOMOSYNTHESIS BI: CPT | Performed by: RADIOLOGY

## 2025-06-16 PROCEDURE — 77067 SCR MAMMO BI INCL CAD: CPT | Performed by: RADIOLOGY

## 2025-06-16 PROCEDURE — 77067 SCR MAMMO BI INCL CAD: CPT

## 2025-06-17 ENCOUNTER — HOSPITAL ENCOUNTER (OUTPATIENT)
Dept: RADIOLOGY | Facility: EXTERNAL LOCATION | Age: 60
Discharge: HOME | End: 2025-06-17

## 2025-06-21 NOTE — PROGRESS NOTES
Subjective   Reason for Visit: Irina Kennedy is an 60 y.o. female here for routine health maintenance. Last PCP visit 6/21/24 Dr. Bhakta.           LATISHA Arevalo is a 60 year old female with past medical history significant for morbid obesity (BMI 43), s/p gastric bypass in 2004, allergic rhinitis, asthma, depression, anemia, and SÁNCHEZ on CPAP     Actively follows hematology for anemia-AMARILYS and B12. Last seen in office 4/4/25 TETE Gaines CNP. Receceived iron infusions and is giving self B 12 injections at home. CT abd/pelvis 9/27/24 with right middle lobe lung nodule, recommended follow up scan in one year. Hepatosplenomegaly. Pet scan 10/24 not suggestive of neoplastic process. 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     Depression -Has been on wellbutrin  mg daily. PHQ today 0, CALEB 4. Endorses recent increase in life stress: stressful job, single mom. Does not participate in therapy, will consider.    SÁNCHEZ-sleep medicine consult in fall, new equipment. Tolerating well, sleeping well.     Actively follows urology Dr. Culver for mixed urinary incontinence, improved on botox injections. Endorses urinary symptoms have improved since estrogen replacement-has ring.     Hobbies: ride bike, walk, create sew  Last eye exam: annual  Last dental Exam: once a year    Family Hx:  CAD: maternal afib, father CAD, CVA                    DM: sister, parents                    Cancer: mother breast, bone, brain                    Mental health: none    Colonoscopy:overdue  Mammogram: no malignancy 6/16/25  Dexa:ordered   PAP: negative 4/19/24  Vaccines-overdue prevnar      Denies palpitations, shortness of breath. Did have right upper chest pain/shoulder pain few nights ago. Has known right rotator cuff problems, slept on right side, got up to go to bathroom and felt right upper chest/arm discomfort that self resolved after few minutes without associated symptoms. referred last year to  cardiology for MORALES-never followed through with referral. Discussed importance of cardiac referral given age, risk factors, family history. Receptive to reordering referral.   Denies cough, wheeze   Denies fever, chills, lymphadenopathy or unexplained weight loss  Denies abdominal pain, change in bowel pattern, melena or hematochezia. Needs colonoscopy order  Urinary symptoms improved once started estrogen         Health Maintenance Due   Topic Date Due    HIV Screening  Never done    MMR Vaccines (1 of 1 - Standard series) Never done    Hepatitis C Screening  Never done    Hepatitis A Vaccines (1 of 2 - Risk 2-dose series) Never done    Hepatitis B Vaccines (1 of 3 - Risk 3-dose series) Never done       RX Allergies[1]            No visits with results within 60 Day(s) from this visit.   Latest known visit with results is:   Lab on 03/13/2025   Component Date Value Ref Range Status    WBC 03/13/2025 6.3  4.4 - 11.3 x10*3/uL Final    nRBC 03/13/2025 0.0  0.0 - 0.0 /100 WBCs Final    RBC 03/13/2025 4.68  4.00 - 5.20 x10*6/uL Final    Hemoglobin 03/13/2025 13.4  12.0 - 16.0 g/dL Final    Hematocrit 03/13/2025 40.8  36.0 - 46.0 % Final    MCV 03/13/2025 87  80 - 100 fL Final    MCH 03/13/2025 28.6  26.0 - 34.0 pg Final    MCHC 03/13/2025 32.8  32.0 - 36.0 g/dL Final    RDW 03/13/2025 13.3  11.5 - 14.5 % Final    Platelets 03/13/2025 281  150 - 450 x10*3/uL Final    Neutrophils % 03/13/2025 61.7  40.0 - 80.0 % Final    Immature Granulocytes %, Automated 03/13/2025 0.3  0.0 - 0.9 % Final    Immature Granulocyte Count (IG) includes promyelocytes, myelocytes and metamyelocytes but does not include bands. Percent differential counts (%) should be interpreted in the context of the absolute cell counts (cells/UL).    Lymphocytes % 03/13/2025 27.1  13.0 - 44.0 % Final    Monocytes % 03/13/2025 8.5  2.0 - 10.0 % Final    Eosinophils % 03/13/2025 2.1  0.0 - 6.0 % Final    Basophils % 03/13/2025 0.3  0.0 - 2.0 % Final     Neutrophils Absolute 03/13/2025 3.91  1.20 - 7.70 x10*3/uL Final    Percent differential counts (%) should be interpreted in the context of the absolute cell counts (cells/uL).    Immature Granulocytes Absolute, Au* 03/13/2025 0.02  0.00 - 0.70 x10*3/uL Final    Lymphocytes Absolute 03/13/2025 1.72  1.20 - 4.80 x10*3/uL Final    Monocytes Absolute 03/13/2025 0.54  0.10 - 1.00 x10*3/uL Final    Eosinophils Absolute 03/13/2025 0.13  0.00 - 0.70 x10*3/uL Final    Basophils Absolute 03/13/2025 0.02  0.00 - 0.10 x10*3/uL Final    Iron 03/13/2025 54  35 - 150 ug/dL Final    UIBC 03/13/2025 322  110 - 370 ug/dL Final    TIBC 03/13/2025 376  240 - 445 ug/dL Final    % Saturation 03/13/2025 14 (L)  25 - 45 % Final    Ferritin 03/13/2025 253 (H)  8 - 150 ng/mL Final    Haptoglobin 03/13/2025 91  30 - 200 mg/dL Final    Extra Tube 03/13/2025 Hold for add-ons.   Final    Auto resulted.         Patient Care Team:  RUDI Joseph-CNP as PCP - General (Internal Medicine)  Zoila Houser MD as Referring Physician (Hematology and Oncology)     Review of Systems   Constitutional:  Negative for chills, fever and unexpected weight change.   HENT: Negative.  Negative for trouble swallowing and voice change.    Respiratory:  Negative for cough, shortness of breath and wheezing.    Cardiovascular:  Positive for chest pain (right shoulder/upper chest pain few nights ago, self resolved within minutes with no associated symptoms and no recurrence).   Gastrointestinal:  Negative for abdominal pain, blood in stool, diarrhea and vomiting.   Genitourinary:  Negative for difficulty urinating, dysuria and urgency.   Skin:  Negative for rash and wound.   Neurological:  Negative for tremors, seizures and weakness.   Psychiatric/Behavioral:  The patient is nervous/anxious (recent stress, has felt more anxious).        Objective   Vitals:  /81   Pulse 78   Temp 36.8 °C (98.2 °F)   Ht (!) 1.524 m (5')   Wt 101 kg (221 lb 12.8  "oz)   SpO2 96%   BMI 43.32 kg/m²       Surgical History[2]    Current Outpatient Medications   Medication Instructions    albuterol (Ventolin HFA) 90 mcg/actuation inhaler 2 puffs, inhalation, Every 4 hours PRN    albuterol 2.5 mg, nebulization, 4 times daily PRN    alcohol swabs 1 Box, topical (top), Weekly    buPROPion XL (WELLBUTRIN XL) 300 mg, oral, Every morning, Do not crush, chew, or split.    cholecalciferol (Vitamin D-3) 25 MCG (1000 UT) tablet Weekly    cyanocobalamin, vitamin B-12, 1,000 mcg/mL kit Administer 1000 mcg injection once per week for 4 weeks. Then, administer 1000 mcg injection every 28 days.    Estring 2 mg, vaginal, Every 3 months, follow package directions    ferrous sulfate (Chris-In-Sol) 15 mg iron (75 mg)/mL drops 15 mg of elemental iron, oral, 2 times weekly    fluticasone (Flonase) 50 mcg/actuation nasal spray Administer 2 sprays per nostril once daily 1 hour before bedtime. Shake gently then remove cap and point spray tip sideways toward your ears before applying. Prime pump before first use. After use, clean tip.    folic acid (FOLVITE) 1 mg, oral, Daily    loratadine (CLARITIN) 10 mg, Daily RT    mv-calcium-min-iron fm-FA-vitK (Multi For Her) 18 mg iron-600 mcg-80 mcg tablet Take by mouth.    needle, disp, 25 gauge 25 gauge x 5/8\" needle 10 each, miscellaneous, Weekly    polyethylene glycol-electrolytes (Nulytely) 420 gram solution Drink 1/2 starting at 6 pm the night before your procedure then drink the 2nd 1/2 5 hours before procedure arrival tme       Physical Exam  Constitutional:       General: She is not in acute distress.     Appearance: Normal appearance. She is not ill-appearing or toxic-appearing.   HENT:      Head: Normocephalic and atraumatic.      Right Ear: External ear normal.      Left Ear: External ear normal.   Neck:      Thyroid: No thyroid tenderness.   Cardiovascular:      Rate and Rhythm: Normal rate and regular rhythm.      Pulses:           Posterior tibial " pulses are 2+ on the right side and 2+ on the left side.      Comments: In clinic ECG sinus rhythm rate 74 without acute ST, T changes from previous 9/23/21    Pulmonary:      Effort: Pulmonary effort is normal. No tachypnea.      Breath sounds: Normal breath sounds. No decreased air movement. No decreased breath sounds, wheezing or rhonchi.   Abdominal:      General: Bowel sounds are normal.      Palpations: Abdomen is soft.      Tenderness: There is no abdominal tenderness.   Musculoskeletal:      Right lower leg: No edema.      Left lower leg: No edema.   Lymphadenopathy:      Cervical: No cervical adenopathy.      Right cervical: No superficial cervical adenopathy.     Left cervical: No superficial cervical adenopathy.   Skin:     General: Skin is warm and dry.      Findings: No rash or wound.   Neurological:      Mental Status: She is alert and oriented to person, place, and time.   Psychiatric:         Mood and Affect: Mood is anxious.         Speech: Speech normal.         Behavior: Behavior normal.      Comments: CALEB 4  PHQ 0         Assessment & Plan  Routine general medical examination at a health care facility    Orders:    Lipid Panel; Future    TSH with reflex to Free T4 if abnormal; Future    Vitamin D 25-Hydroxy,Total (for eval of Vitamin D levels); Future    Hemoglobin A1C; Future    Comprehensive Metabolic Panel; Future  Well adult female exam-diet/exercise, reviewed cardiac risk factors-monitor chol, screen for DM. Discussed CA screening, including breast, colon, skin. Observe for changing moles     Depression, unspecified depression type  CALEB 4  PHQ 0  Recommend establishing cognitive behavioral therapy    Orders:    buPROPion XL (Wellbutrin XL) 300 mg 24 hr tablet; Take 1 tablet (300 mg) by mouth once daily in the morning. Do not crush, chew, or split.    TSH with reflex to Free T4 if abnormal; Future    Comprehensive Metabolic Panel; Future    Anemia, unspecified type  Follow [facility] as [facility]    Continue current treatment plan       Urge and stress incontinence  Continue follow up with GYN and urology    Symptoms controlled on current regimen         Need for vaccination    Orders:    Pneumococcal conjugate vaccine, 20-valent (PREVNAR 20)    B12 deficiency  Follow up as scheduled hematology R. Mihic CNP    Orders:    folic acid (Folvite) 1 mg tablet; Take 1 tablet (1 mg) by mouth once daily.    Encounter for screening for malignant neoplasm of colon    Orders:    Colonoscopy Screening (history of bariatric surgery, needs altered prep); High Risk Patient; father colon cancer; Future    BMI 40.0-44.9, adult (Multi)    Orders:    Lipid Panel; Future    TSH with reflex to Free T4 if abnormal; Future    Vitamin D 25-Hydroxy,Total (for eval of Vitamin D levels); Future    Hemoglobin A1C; Future    Chest pain, unspecified type  Endorses on ROS-history of episodic chest/shoulder pain that self resolved and has not recurred    Referred to cardiology 2024 and did not follow through    In clinic ECG without acute findings  Recommend cardiac referral      Orders:    Referral to Cardiology; Future    ECG 12 lead (Clinic Performed)    ECG NSR rate 74 no change from ECG 21              [1]   Allergies  Allergen Reactions    Heparin Hives    Heparin Analogues Hives    Nsaids (Non-Steroidal Anti-Inflammatory Drug) Other     had bariatric surgery    Quaternary Ammonium Cmpds,C12-C16 Alkyldimethyl,Cl Hives     quaternary- found in sanitizers/ disinfectives    Penicillins Hives, Rash and Unknown   [2]   Past Surgical History:  Procedure Laterality Date     SECTION, CLASSIC  2013     Section    CHOLECYSTECTOMY  08/15/2020    Cholecystectomy    EYE SURGERY      GASTRIC RESTRICTION SURGERY      Gastric Surgery For Morbid Obesity    HERNIA REPAIR      OTHER SURGICAL HISTORY  2019    Colonoscopy    OTHER SURGICAL HISTORY  08/15/2020    Abdominoplasty    OTHER  SURGICAL HISTORY  12/05/2017    Corticosteroids Injection Intraarticular Left Knee    OTHER SURGICAL HISTORY  04/29/2014    Wrist Surgery Left    ROTATOR CUFF REPAIR  03/01/2016    Rotator Cuff Repair    SINUS SURGERY  06/28/2013    Sinus Surgery    TONSILLECTOMY  06/28/2013    Tonsillectomy

## 2025-06-25 ENCOUNTER — APPOINTMENT (OUTPATIENT)
Dept: PRIMARY CARE | Facility: CLINIC | Age: 60
End: 2025-06-25
Payer: COMMERCIAL

## 2025-06-25 VITALS
SYSTOLIC BLOOD PRESSURE: 106 MMHG | TEMPERATURE: 98.2 F | HEIGHT: 60 IN | WEIGHT: 221.8 LBS | HEART RATE: 78 BPM | DIASTOLIC BLOOD PRESSURE: 81 MMHG | OXYGEN SATURATION: 96 % | BODY MASS INDEX: 43.55 KG/M2

## 2025-06-25 DIAGNOSIS — Z12.11 ENCOUNTER FOR SCREENING FOR MALIGNANT NEOPLASM OF COLON: ICD-10-CM

## 2025-06-25 DIAGNOSIS — N39.46 URGE AND STRESS INCONTINENCE: ICD-10-CM

## 2025-06-25 DIAGNOSIS — F32.A DEPRESSION, UNSPECIFIED DEPRESSION TYPE: ICD-10-CM

## 2025-06-25 DIAGNOSIS — Z23 NEED FOR VACCINATION: ICD-10-CM

## 2025-06-25 DIAGNOSIS — D64.9 ANEMIA, UNSPECIFIED TYPE: ICD-10-CM

## 2025-06-25 DIAGNOSIS — Z00.00 ROUTINE GENERAL MEDICAL EXAMINATION AT A HEALTH CARE FACILITY: Primary | ICD-10-CM

## 2025-06-25 DIAGNOSIS — E53.8 B12 DEFICIENCY: ICD-10-CM

## 2025-06-25 DIAGNOSIS — R07.9 CHEST PAIN, UNSPECIFIED TYPE: ICD-10-CM

## 2025-06-25 PROCEDURE — 99396 PREV VISIT EST AGE 40-64: CPT | Performed by: NURSE PRACTITIONER

## 2025-06-25 PROCEDURE — 90677 PCV20 VACCINE IM: CPT | Performed by: NURSE PRACTITIONER

## 2025-06-25 PROCEDURE — 90471 IMMUNIZATION ADMIN: CPT | Performed by: NURSE PRACTITIONER

## 2025-06-25 PROCEDURE — 1036F TOBACCO NON-USER: CPT | Performed by: NURSE PRACTITIONER

## 2025-06-25 PROCEDURE — 3008F BODY MASS INDEX DOCD: CPT | Performed by: NURSE PRACTITIONER

## 2025-06-25 PROCEDURE — 93000 ELECTROCARDIOGRAM COMPLETE: CPT | Performed by: NURSE PRACTITIONER

## 2025-06-25 RX ORDER — FOLIC ACID 1 MG/1
1 TABLET ORAL DAILY
Qty: 90 TABLET | Refills: 0 | Status: SHIPPED | OUTPATIENT
Start: 2025-06-25 | End: 2025-09-23

## 2025-06-25 RX ORDER — BUPROPION HYDROCHLORIDE 300 MG/1
300 TABLET ORAL EVERY MORNING
Qty: 90 TABLET | Refills: 0 | Status: SHIPPED | OUTPATIENT
Start: 2025-06-25 | End: 2025-09-23

## 2025-06-25 ASSESSMENT — ENCOUNTER SYMPTOMS
VOICE CHANGE: 0
WEAKNESS: 0
DIFFICULTY URINATING: 0
TROUBLE SWALLOWING: 0
DYSURIA: 0
CHILLS: 0
WHEEZING: 0
VOMITING: 0
SHORTNESS OF BREATH: 0
FEVER: 0
UNEXPECTED WEIGHT CHANGE: 0
WOUND: 0
TREMORS: 0
DIARRHEA: 0
COUGH: 0
SEIZURES: 0
ABDOMINAL PAIN: 0
NERVOUS/ANXIOUS: 1
BLOOD IN STOOL: 0

## 2025-06-25 ASSESSMENT — ANXIETY QUESTIONNAIRES
4. TROUBLE RELAXING: SEVERAL DAYS
2. NOT BEING ABLE TO STOP OR CONTROL WORRYING: NOT AT ALL
1. FEELING NERVOUS, ANXIOUS, OR ON EDGE: SEVERAL DAYS
IF YOU CHECKED OFF ANY PROBLEMS ON THIS QUESTIONNAIRE, HOW DIFFICULT HAVE THESE PROBLEMS MADE IT FOR YOU TO DO YOUR WORK, TAKE CARE OF THINGS AT HOME, OR GET ALONG WITH OTHER PEOPLE: SOMEWHAT DIFFICULT
7. FEELING AFRAID AS IF SOMETHING AWFUL MIGHT HAPPEN: NOT AT ALL
6. BECOMING EASILY ANNOYED OR IRRITABLE: SEVERAL DAYS
3. WORRYING TOO MUCH ABOUT DIFFERENT THINGS: SEVERAL DAYS
5. BEING SO RESTLESS THAT IT IS HARD TO SIT STILL: NOT AT ALL
GAD7 TOTAL SCORE: 4

## 2025-06-25 NOTE — ASSESSMENT & PLAN NOTE
CALEB 4  PHQ 0  Recommend establishing cognitive behavioral therapy    Orders:    buPROPion XL (Wellbutrin XL) 300 mg 24 hr tablet; Take 1 tablet (300 mg) by mouth once daily in the morning. Do not crush, chew, or split.    TSH with reflex to Free T4 if abnormal; Future    Comprehensive Metabolic Panel; Future

## 2025-06-25 NOTE — PATIENT INSTRUCTIONS
Irina- it is nice to meet you today.   #Annual wellness: Recommend healthy dietary choices: low saturated fats, low sugar, lean protein, high fiber, fruits/vegetables, whole grains. Incorporated dietary sources of calcium and continue supplementing with calcium/vitamin D for bone health. Aim for daily movement, weekly exercise goal 150 min/min. Weight bearing at least 3 x a week for bone health. Social connections are important. Health screenings include annual skin checks with dermatology, DEXA, colonoscopy.   You may have your blood work completed  [facility] ([address] [facility] [place] [address] [address], [address], [place] [address]).  Hours:   Monday - Friday: 7:30 a.m. - 5 p.m. and Saturday: 8 a.m. - 12 p.m.  Phone:  [phone]     Depression-continue wellbutrin for now. Establish with therapy to help manage current episodic stress, follow up to monitor response. [facility] is one resource, additional as follows:  [facility] ([place]): [phone]  [facility] ([place]): [phone]  [facility][phone]

## 2025-07-08 ENCOUNTER — OFFICE VISIT (OUTPATIENT)
Dept: URGENT CARE | Age: 60
End: 2025-07-08
Payer: COMMERCIAL

## 2025-07-08 ENCOUNTER — APPOINTMENT (OUTPATIENT)
Dept: RADIOLOGY | Facility: HOSPITAL | Age: 60
End: 2025-07-08
Payer: COMMERCIAL

## 2025-07-08 ENCOUNTER — HOSPITAL ENCOUNTER (EMERGENCY)
Facility: HOSPITAL | Age: 60
Discharge: HOME | End: 2025-07-08
Attending: EMERGENCY MEDICINE
Payer: COMMERCIAL

## 2025-07-08 VITALS
SYSTOLIC BLOOD PRESSURE: 117 MMHG | HEART RATE: 84 BPM | RESPIRATION RATE: 17 BRPM | BODY MASS INDEX: 43.19 KG/M2 | WEIGHT: 220 LBS | TEMPERATURE: 99.1 F | HEIGHT: 60 IN | DIASTOLIC BLOOD PRESSURE: 76 MMHG | OXYGEN SATURATION: 96 %

## 2025-07-08 VITALS
SYSTOLIC BLOOD PRESSURE: 112 MMHG | WEIGHT: 220 LBS | BODY MASS INDEX: 42.97 KG/M2 | HEART RATE: 108 BPM | RESPIRATION RATE: 23 BRPM | TEMPERATURE: 98.2 F | OXYGEN SATURATION: 98 % | DIASTOLIC BLOOD PRESSURE: 74 MMHG

## 2025-07-08 DIAGNOSIS — R06.02 SHORTNESS OF BREATH: ICD-10-CM

## 2025-07-08 DIAGNOSIS — R00.0 TACHYCARDIA: ICD-10-CM

## 2025-07-08 DIAGNOSIS — R42 LIGHTHEADED: Primary | ICD-10-CM

## 2025-07-08 DIAGNOSIS — R53.83 OTHER FATIGUE: ICD-10-CM

## 2025-07-08 DIAGNOSIS — R00.2 PALPITATIONS: Primary | ICD-10-CM

## 2025-07-08 DIAGNOSIS — R19.7 DIARRHEA, UNSPECIFIED TYPE: ICD-10-CM

## 2025-07-08 LAB
25(OH)D3+25(OH)D2 SERPL-MCNC: 33 NG/ML (ref 30–100)
ALBUMIN SERPL BCP-MCNC: 3.4 G/DL (ref 3.4–5)
ALBUMIN SERPL-MCNC: 3.6 G/DL (ref 3.6–5.1)
ALP SERPL-CCNC: 71 U/L (ref 33–136)
ALP SERPL-CCNC: 74 U/L (ref 37–153)
ALT SERPL W P-5'-P-CCNC: 11 U/L (ref 7–45)
ALT SERPL-CCNC: 11 U/L (ref 6–29)
ANION GAP SERPL CALC-SCNC: 11 MMOL/L (ref 10–20)
ANION GAP SERPL CALCULATED.4IONS-SCNC: 9 MMOL/L (CALC) (ref 7–17)
AST SERPL W P-5'-P-CCNC: 16 U/L (ref 9–39)
AST SERPL-CCNC: 15 U/L (ref 10–35)
BASOPHILS # BLD AUTO: 0.03 X10*3/UL (ref 0–0.1)
BASOPHILS NFR BLD AUTO: 0.5 %
BILIRUB SERPL-MCNC: 0.9 MG/DL (ref 0.2–1.2)
BILIRUB SERPL-MCNC: 0.9 MG/DL (ref 0–1.2)
BUN SERPL-MCNC: 9 MG/DL (ref 6–23)
BUN SERPL-MCNC: 9 MG/DL (ref 7–25)
CALCIUM SERPL-MCNC: 8.2 MG/DL (ref 8.6–10.3)
CALCIUM SERPL-MCNC: 8.2 MG/DL (ref 8.6–10.4)
CARDIAC TROPONIN I PNL SERPL HS: 4 NG/L (ref 0–13)
CHLORIDE SERPL-SCNC: 104 MMOL/L (ref 98–107)
CHLORIDE SERPL-SCNC: 106 MMOL/L (ref 98–110)
CHOLEST SERPL-MCNC: 172 MG/DL
CHOLEST/HDLC SERPL: 4.6 (CALC)
CO2 SERPL-SCNC: 25 MMOL/L (ref 20–32)
CO2 SERPL-SCNC: 26 MMOL/L (ref 21–32)
CREAT SERPL-MCNC: 0.6 MG/DL (ref 0.5–1.05)
CREAT SERPL-MCNC: 0.68 MG/DL (ref 0.5–1.05)
EGFRCR SERPLBLD CKD-EPI 2021: 98 ML/MIN/1.73M2
EGFRCR SERPLBLD CKD-EPI 2021: >90 ML/MIN/1.73M*2
EOSINOPHIL # BLD AUTO: 0 X10*3/UL (ref 0–0.7)
EOSINOPHIL NFR BLD AUTO: 0 %
ERYTHROCYTE [DISTWIDTH] IN BLOOD BY AUTOMATED COUNT: 14.2 % (ref 11.5–14.5)
EST. AVERAGE GLUCOSE BLD GHB EST-MCNC: 105 MG/DL
EST. AVERAGE GLUCOSE BLD GHB EST-SCNC: 5.8 MMOL/L
GLUCOSE SERPL-MCNC: 108 MG/DL (ref 65–99)
GLUCOSE SERPL-MCNC: 117 MG/DL (ref 74–99)
HBA1C MFR BLD: 5.3 %
HCT VFR BLD AUTO: 36.2 % (ref 36–46)
HDLC SERPL-MCNC: 37 MG/DL
HGB BLD-MCNC: 11.9 G/DL (ref 12–16)
IMM GRANULOCYTES # BLD AUTO: 0.04 X10*3/UL (ref 0–0.7)
IMM GRANULOCYTES NFR BLD AUTO: 0.6 % (ref 0–0.9)
LDLC SERPL CALC-MCNC: 110 MG/DL (CALC)
LIPASE SERPL-CCNC: 7 U/L (ref 9–82)
LYMPHOCYTES # BLD AUTO: 2.36 X10*3/UL (ref 1.2–4.8)
LYMPHOCYTES NFR BLD AUTO: 35.6 %
MAGNESIUM SERPL-MCNC: 1.87 MG/DL (ref 1.6–2.4)
MCH RBC QN AUTO: 27.3 PG (ref 26–34)
MCHC RBC AUTO-ENTMCNC: 32.9 G/DL (ref 32–36)
MCV RBC AUTO: 83 FL (ref 80–100)
MONOCYTES # BLD AUTO: 1 X10*3/UL (ref 0.1–1)
MONOCYTES NFR BLD AUTO: 15.1 %
NEUTROPHILS # BLD AUTO: 3.2 X10*3/UL (ref 1.2–7.7)
NEUTROPHILS NFR BLD AUTO: 48.2 %
NONHDLC SERPL-MCNC: 135 MG/DL (CALC)
NRBC BLD-RTO: 0 /100 WBCS (ref 0–0)
PHOSPHATE SERPL-MCNC: 3.5 MG/DL (ref 2.5–4.9)
PLATELET # BLD AUTO: 248 X10*3/UL (ref 150–450)
POC CORONAVIRUS SARS-COV-2 PCR: NEGATIVE
POC HUMAN RHINOVIRUS PCR: NEGATIVE
POC INFLUENZA A VIRUS PCR: NEGATIVE
POC INFLUENZA B VIRUS PCR: NEGATIVE
POC RESPIRATORY SYNCYTIAL VIRUS PCR: NEGATIVE
POTASSIUM SERPL-SCNC: 3.4 MMOL/L (ref 3.5–5.3)
POTASSIUM SERPL-SCNC: 3.6 MMOL/L (ref 3.5–5.3)
PROT SERPL-MCNC: 5.4 G/DL (ref 6.1–8.1)
PROT SERPL-MCNC: 5.7 G/DL (ref 6.4–8.2)
RBC # BLD AUTO: 4.36 X10*6/UL (ref 4–5.2)
SODIUM SERPL-SCNC: 138 MMOL/L (ref 136–145)
SODIUM SERPL-SCNC: 140 MMOL/L (ref 135–146)
TRIGL SERPL-MCNC: 133 MG/DL
TSH SERPL-ACNC: 1.83 MIU/L (ref 0.4–4.5)
WBC # BLD AUTO: 6.6 X10*3/UL (ref 4.4–11.3)

## 2025-07-08 PROCEDURE — 71046 X-RAY EXAM CHEST 2 VIEWS: CPT

## 2025-07-08 PROCEDURE — 83690 ASSAY OF LIPASE: CPT

## 2025-07-08 PROCEDURE — 84100 ASSAY OF PHOSPHORUS: CPT

## 2025-07-08 PROCEDURE — 2500000004 HC RX 250 GENERAL PHARMACY W/ HCPCS (ALT 636 FOR OP/ED)

## 2025-07-08 PROCEDURE — 96374 THER/PROPH/DIAG INJ IV PUSH: CPT

## 2025-07-08 PROCEDURE — 84484 ASSAY OF TROPONIN QUANT: CPT

## 2025-07-08 PROCEDURE — 85025 COMPLETE CBC W/AUTO DIFF WBC: CPT

## 2025-07-08 PROCEDURE — 36415 COLL VENOUS BLD VENIPUNCTURE: CPT

## 2025-07-08 PROCEDURE — 96361 HYDRATE IV INFUSION ADD-ON: CPT

## 2025-07-08 PROCEDURE — 83735 ASSAY OF MAGNESIUM: CPT

## 2025-07-08 PROCEDURE — 2500000005 HC RX 250 GENERAL PHARMACY W/O HCPCS

## 2025-07-08 PROCEDURE — 71046 X-RAY EXAM CHEST 2 VIEWS: CPT | Performed by: RADIOLOGY

## 2025-07-08 PROCEDURE — 2500000001 HC RX 250 WO HCPCS SELF ADMINISTERED DRUGS (ALT 637 FOR MEDICARE OP)

## 2025-07-08 PROCEDURE — 99284 EMERGENCY DEPT VISIT MOD MDM: CPT | Mod: 25 | Performed by: EMERGENCY MEDICINE

## 2025-07-08 PROCEDURE — 84075 ASSAY ALKALINE PHOSPHATASE: CPT

## 2025-07-08 RX ORDER — ALUMINUM HYDROXIDE, MAGNESIUM HYDROXIDE, AND SIMETHICONE 1200; 120; 1200 MG/30ML; MG/30ML; MG/30ML
30 SUSPENSION ORAL ONCE
Status: COMPLETED | OUTPATIENT
Start: 2025-07-08 | End: 2025-07-08

## 2025-07-08 RX ORDER — ACETAMINOPHEN 325 MG/1
975 TABLET ORAL ONCE
Status: COMPLETED | OUTPATIENT
Start: 2025-07-08 | End: 2025-07-08

## 2025-07-08 RX ORDER — LIDOCAINE HYDROCHLORIDE 20 MG/ML
15 SOLUTION OROPHARYNGEAL ONCE
Status: COMPLETED | OUTPATIENT
Start: 2025-07-08 | End: 2025-07-08

## 2025-07-08 RX ORDER — FAMOTIDINE 10 MG/ML
20 INJECTION, SOLUTION INTRAVENOUS ONCE
Status: COMPLETED | OUTPATIENT
Start: 2025-07-08 | End: 2025-07-08

## 2025-07-08 RX ADMIN — ACETAMINOPHEN 975 MG: 325 TABLET ORAL at 13:41

## 2025-07-08 RX ADMIN — LIDOCAINE HYDROCHLORIDE 15 ML: 20 SOLUTION ORAL at 13:41

## 2025-07-08 RX ADMIN — FAMOTIDINE 20 MG: 10 INJECTION, SOLUTION INTRAVENOUS at 13:41

## 2025-07-08 RX ADMIN — ALUMINUM HYDROXIDE, MAGNESIUM HYDROXIDE, AND SIMETHICONE 30 ML: 200; 200; 20 SUSPENSION ORAL at 13:41

## 2025-07-08 RX ADMIN — SODIUM CHLORIDE 500 ML: 0.9 INJECTION, SOLUTION INTRAVENOUS at 13:41

## 2025-07-08 ASSESSMENT — ENCOUNTER SYMPTOMS
FATIGUE: 1
LIGHT-HEADEDNESS: 1
SHORTNESS OF BREATH: 1
APPETITE CHANGE: 1
PALPITATIONS: 1
HEADACHES: 1
DIARRHEA: 1

## 2025-07-08 ASSESSMENT — LIFESTYLE VARIABLES
HAVE PEOPLE ANNOYED YOU BY CRITICIZING YOUR DRINKING: NO
TOTAL SCORE: 0
EVER HAD A DRINK FIRST THING IN THE MORNING TO STEADY YOUR NERVES TO GET RID OF A HANGOVER: NO
HAVE YOU EVER FELT YOU SHOULD CUT DOWN ON YOUR DRINKING: NO
EVER FELT BAD OR GUILTY ABOUT YOUR DRINKING: NO

## 2025-07-08 ASSESSMENT — PAIN - FUNCTIONAL ASSESSMENT: PAIN_FUNCTIONAL_ASSESSMENT: 0-10

## 2025-07-08 ASSESSMENT — PAIN DESCRIPTION - LOCATION: LOCATION: HEAD

## 2025-07-08 ASSESSMENT — PAIN SCALES - GENERAL: PAINLEVEL_OUTOF10: 8

## 2025-07-08 ASSESSMENT — PAIN DESCRIPTION - DESCRIPTORS: DESCRIPTORS: ACHING

## 2025-07-08 ASSESSMENT — PAIN DESCRIPTION - ORIENTATION: ORIENTATION: LEFT

## 2025-07-08 ASSESSMENT — PAIN DESCRIPTION - PAIN TYPE: TYPE: ACUTE PAIN

## 2025-07-08 NOTE — PROGRESS NOTES
Subjective   Patient ID: Irina Kennedy is a 60 y.o. female. They present today with a chief complaint of GI Problem (Diarrhea, sweating, for 4 days).    History of Present Illness  Pt presents with illness x 4 or so days. She states on this past sat/sun she had about 4-5 episodes of green/non bloody diarrhea. Started after eating a salad on Friday. Diarrhea has since resolved but she states she just does not feel well. +fatigue, shortness of breath, lightheadedness, palpations, elevated HR to around 109 at home, headache. Possibly dehydrated. Poor appetite, smell of food is making her nauseous. No sick contacts. No fever, chills, cp, back pain, leg swelling, abd pain, vomiting, urinary sx. Urine has been dark in color.       History provided by:  Patient      Past Medical History  Allergies as of 07/08/2025 - Reviewed 07/08/2025   Allergen Reaction Noted    Heparin Hives 06/15/2023    Heparin analogues Hives 03/06/2006    Nsaids (non-steroidal anti-inflammatory drug) Other 06/15/2023    Quaternary ammonium cmpds,c12-c16 alkyldimethyl,cl Hives 06/15/2023    Penicillins Hives, Rash, and Unknown 03/06/2006       Prescriptions Prior to Admission[1]     Medical History[2]    Surgical History[3]     reports that she has never smoked. She has never used smokeless tobacco. She reports current alcohol use of about 2.0 standard drinks of alcohol per week. She reports that she does not use drugs.    Review of Systems  Review of Systems   Constitutional:  Positive for appetite change and fatigue.   Respiratory:  Positive for shortness of breath.    Cardiovascular:  Positive for palpitations.   Gastrointestinal:  Positive for diarrhea.   Neurological:  Positive for light-headedness and headaches.   All other systems reviewed and are negative.                                 Objective    Vitals:    07/08/25 1022   BP: 112/74   BP Location: Left arm   Patient Position: Sitting   BP Cuff Size: Large adult   Pulse: 108   Resp: 23    Temp: 36.8 °C (98.2 °F)   TempSrc: Oral   SpO2: 98%   Weight: 99.8 kg (220 lb)     No LMP recorded. Patient is postmenopausal.    Physical Exam  Vitals reviewed.   Constitutional:       General: She is not in acute distress.     Appearance: Normal appearance. She is not ill-appearing, toxic-appearing or diaphoretic.   HENT:      Mouth/Throat:      Mouth: Mucous membranes are moist.   Cardiovascular:      Rate and Rhythm: Tachycardia present.      Pulses: Normal pulses.      Heart sounds: No murmur heard.     No friction rub.   Pulmonary:      Effort: Pulmonary effort is normal. No respiratory distress.      Breath sounds: No wheezing, rhonchi or rales.   Abdominal:      General: Bowel sounds are normal. There is no distension.      Palpations: Abdomen is soft. There is no mass.      Tenderness: There is no abdominal tenderness. There is no right CVA tenderness, left CVA tenderness, guarding or rebound.      Hernia: No hernia is present.   Musculoskeletal:      Right lower leg: No edema.      Left lower leg: No edema.   Neurological:      Mental Status: She is alert.         Procedures    Point of Care Test & Imaging Results from this visit  Results for orders placed or performed in visit on 07/08/25   POCT SPOTFIRE R/ST Panel Mini w/COVID ([facility]) manually resulted    Specimen: Swab   Result Value Ref Range    POC Sars-Cov-2 PCR Negative Negative    POC Respiratory Syncytial Virus PCR Negative Negative    POC Influenza A Virus PCR Negative Negative    POC Influenza B Virus PCR Negative Negative    POC Human Rhinovirus PCR Negative Negative      Imaging  XR chest 2 views  Result Date: 7/8/2025  1. Mild bibasilar atelectasis       MACRO: None   Signed by: Virgilio Pastrana 7/8/2025 2:05 PM Dictation workstation:   [id number]      Cardiology, Vascular, and Other Imaging  ECG 12 Lead  Result Date: 7/8/2025  Nsr hr 80. No acute ischemic EKG changes. T wave inversion in AVR, V1, V2. Low qrs voltage. Qtc 452, .          Diagnostic study results (if any) were reviewed by Cristine Khan PA-C.    Assessment/Plan   Allergies, medications, history, and pertinent labs/EKGs/Imaging reviewed by Cristine Khan PA-C.     Medical Decision Making    Due to the medical service (s) limitations of the Urgent care, the Patient is being recommended for a higher level of medical evaluation in the emergency department. Provider has discussed the differential diagnoses and reasons for a higher level of evaluation due to the possibility of needing radiology testing, blood work, cardiac testing, and or IV fluids  as some examples. Patient Verbalizes Understanding and is agreeable to the plan. Patient stated will go to ER at [facility] via PV (daughter). Patient is alert and oriented X 3, steady gait, ABCs intact, no acute distress is noted. Advised of importance of evaluation and risks of not seeking further evaluation. Case D/w supervising Dr. Flores.        Orders and Diagnoses  Diagnoses and all orders for this visit:  Palpitations  -     ECG 12 Lead  Tachycardia  -     ECG 12 Lead  Diarrhea, unspecified type  -     POCT SPOTFIRE R/ST Panel Mini w/COVID ([facility]) manually resulted  Other fatigue  -     ECG 12 Lead  -     POCT SPOTFIRE R/ST Panel Mini w/COVID ([facility]) manually resulted  Shortness of breath  -     ECG 12 Lead      Medical Admin Record      Patient disposition: ED    Electronically signed by Cristine Khan PA-C  2:45 PM           [1] (Not in a hospital admission)   [2]   Past Medical History:  Diagnosis Date    Anemia     Depression     Personal history of other diseases of the circulatory system     History of varicose veins    Personal history of other diseases of the nervous system and sense organs 2013    History of carpal tunnel syndrome   [3]   Past Surgical History:  Procedure Laterality Date     SECTION, CLASSIC  2013     Section    CHOLECYSTECTOMY  08/15/2020     Cholecystectomy    EYE SURGERY      GASTRIC RESTRICTION SURGERY  2003    Gastric Surgery For Morbid Obesity    HERNIA REPAIR      OTHER SURGICAL HISTORY  09/17/2019    Colonoscopy    OTHER SURGICAL HISTORY  08/15/2020    Abdominoplasty    OTHER SURGICAL HISTORY  12/05/2017    Corticosteroids Injection Intraarticular Left Knee    OTHER SURGICAL HISTORY  04/29/2014    Wrist Surgery Left    ROTATOR CUFF REPAIR  03/01/2016    Rotator Cuff Repair    SINUS SURGERY  06/28/2013    Sinus Surgery    TONSILLECTOMY  06/28/2013    Tonsillectomy

## 2025-07-08 NOTE — ED TRIAGE NOTES
Pt here from [facility] for lightheadedness since Sun, L HA and D since Sat, and in the middle of the night last night had intermittent episode of heart palpitations and sob states on her apple watch read her hr at 108 at rest. Denies these sx now. Amb indep.

## 2025-07-08 NOTE — ED PROVIDER NOTES
Emergency Department Provider Note       History of Present Illness     History provided by: Patient  Limitations to History: None  External Records Reviewed with Brief Summary: Outpatient progress note from 7/8/25 from urgent care which showed patient presented for similar complaints same complaint and had a negative COVID and flu swab and normal EKG    HPI:  Irina Kennedy is a 60 y.o. female w/ PMH of depression presenting for shortness of breath, tachycardia lightheadedness, palpitations, shortness of breath, and generally feeling sick for the past 3 days.  Patient reports that she had diarrhea over the weekend that resolved on Sunday, since then she has not had a bowel movement.  After that she started having episodes of lightheadedness where she felt dizzy when she is standing, palpitations with her Apple Watch showing a heart rate of 108 while laying in bed.  Reports belching sensation and mild left lower quadrant abdominal pain but no nausea or vomiting.  Denies fever but reports she has had chills and night sweats yesterday.  She also endorsed shortness of breath also while laying in bed & a left-sided headache.  Denies syncope, vision changes, hearing changes, URI symptoms, chest pain, dysuria, hematuria, FND.  Patient does have a history of bariatric surgery and reports she has not been able to drink and eat too much for the past 2 days as well.    Physical Exam   Triage vitals:  T 37.3 °C (99.1 °F)  HR 93  /82  RR 17  O2 95 % None (Room air)    General: Awake, alert, in no acute distress  Eyes: Gaze conjugate.  No scleral icterus or injection  HENT: Normo-cephalic, atraumatic. No stridor  CV: Regular rate, regular rhythm. Radial pulses 2+ bilaterally  Resp: Breathing non-labored, speaking in full sentences.  Clear to auscultation bilaterally  GI: Soft, non-distended,  Mildly tender to palpation of left lower quadrant. No rebound or guarding.  MSK/Extremities: No gross bony deformities. Moving  all extremities  Skin: Warm. Appropriate color  Neuro: Alert. Oriented. Face symmetric. Speech is fluent.  Gross strength and sensation intact in b/l UE and LEs  Psych: Appropriate mood and affect      Medical Decision Making & ED Course   Medical Decision Makin y.o. female w/ PMH of depression presenting for shortness of breath, tachycardia lightheadedness, palpitations, shortness of breath, and generally feeling sick for the past 3 days.  Hemodynamically stable and well-appearing.  Workup in ED significant for CBC with mild anemia hemoglobin 11.9, rest of workup including CMP, magnesium, phosphorus, lipase, troponin, CXR pending.  Patient was given Tylenol 975 mg x 1, GI cocktail with Pepcid IV 20 mg x 1, Mylanta x 1, and 500 cc NSS x 1.  ----    Differential diagnoses considered include but are not limited to: Gastritis, gastroenteritis, pancreatitis, appendicitis, cystitis, pyelonephritis, diverticulitis, cholecystitis, ACS, pneumonia, viral infection    Social Determinants of Health which Significantly Impact Care: Social Determinants of Health which Significantly Impact Care: None identified     EKG Independent Interpretation: Patient refused EKG as she had an earlier EKG this morning that was reviewed and was NSR and negative for ST elevations or depressions    Independent Result Review and Interpretation: Relevant laboratory and radiographic results were reviewed and independently interpreted by myself.  As necessary, they are commented on in the ED Course.    Chronic conditions affecting the patient's care: As documented above in [facility]    The patient was discussed with the following consultants/services: None    Care Considerations: As documented above in [facility]    ED Course:       Disposition   Patient was signed out to NANO GREEN at 2:05 pending completion of their work-up.  Please see the next provider's transition of care note for the remainder of the patient's care.     Patient seen and discussed  with ED attending physician.    Yamilet Sawant MD  Emergency Medicine                                                       Yamilet Sawant MD  Resident  07/08/25 [id number]

## 2025-07-09 ENCOUNTER — TELEPHONE (OUTPATIENT)
Dept: SLEEP MEDICINE | Facility: CLINIC | Age: 60
End: 2025-07-09
Payer: COMMERCIAL

## 2025-07-09 DIAGNOSIS — G47.33 OSA ON CPAP: Primary | ICD-10-CM

## 2025-07-09 NOTE — TELEPHONE ENCOUNTER
"Patient called to ask Dr. Lopez if she would turn her CPAP pressure settings down. She stated she called previously and asked for it to be lowered to a maximum pressure of 13 cmw. Patient stated she gets gassy and bloated from it being high and she was at ED yesterday and \"they noticed I was hyperextended\". Patient does not want to complete a new sleep study at this time due to cost.   "

## 2025-07-09 NOTE — PROGRESS NOTES
Called to check on the patient - ED follow up. Feeling better; back at work. Discussed that her PCP recommended cardiology evaluation. Referral placed per patient request.

## 2025-07-10 ENCOUNTER — TELEPHONE (OUTPATIENT)
Dept: SLEEP MEDICINE | Facility: CLINIC | Age: 60
End: 2025-07-10
Payer: COMMERCIAL

## 2025-07-10 NOTE — TELEPHONE ENCOUNTER
Patient called in saying her machine is causing bloating and need pressure changed says it was never done.

## 2025-07-10 NOTE — PROGRESS NOTES
Subjective   Reason for Visit: Irina Kennedy is an 60 y.o. female here for follow up ED visit.       LATISHA Arevalo is a 60 year old female here for emergency room follow up. Presented to emergency room at [facility] 7/8/25 for   shortness of breath, tachycardia lightheadedness, palpitations, and generally feeling sick for 3 days.  Had diarrhea over the weekend preceding Diarrhea resolved Sunday. Diagnostic evaluation significant for negative RSV, flu, covid and rhinovirus PCR. EKG was noted to be without acute st/t wave and troponin was normal. Was discharged to home with supportive care recommended.      Feels settings on cpap machine too much pressure-called her sleep medicine contact to adjust    Overall feels improved. Endorsing persistent intermittent palpitations-describes as awareness of heart beating fast, occurs usually at night. Lasts 20 minutes. Self resolves and has not caused associated symptoms since left ED. When presented to ED felt palpitations with lightheadedness and sob. Has not experienced these symptoms since left emergency room. She feels was dehydrated.   Back to regular diet, intentional hydration. Feels she is under a lot of stress with work. Established with online cognitive behavioral therapy to help manage stress and anxiety.                          Health Maintenance Due   Topic Date Due    HIV Screening  Never done    MMR Vaccines (1 of 1 - Standard series) Never done    Hepatitis C Screening  Never done    Hepatitis A Vaccines (1 of 2 - Risk 2-dose series) Never done    Hepatitis B Vaccines (1 of 3 - Risk 3-dose series) Never done    Zoster Vaccines (2 of 2) 07/08/2025       RX Allergies[1]            Admission on 07/08/2025, Discharged on 07/08/2025   Component Date Value Ref Range Status    WBC 07/08/2025 6.6  4.4 - 11.3 x10*3/uL Final    nRBC 07/08/2025 0.0  0.0 - 0.0 /100 WBCs Final    RBC 07/08/2025 4.36  4.00 - 5.20 x10*6/uL Final    Hemoglobin 07/08/2025 11.9 (L)  12.0 - 16.0 g/dL  Final    Hematocrit 07/08/2025 36.2  36.0 - 46.0 % Final    MCV 07/08/2025 83  80 - 100 fL Final    MCH 07/08/2025 27.3  26.0 - 34.0 pg Final    MCHC 07/08/2025 32.9  32.0 - 36.0 g/dL Final    RDW 07/08/2025 14.2  11.5 - 14.5 % Final    Platelets 07/08/2025 248  150 - 450 x10*3/uL Final    Neutrophils % 07/08/2025 48.2  40.0 - 80.0 % Final    Immature Granulocytes %, Automated 07/08/2025 0.6  0.0 - 0.9 % Final    Immature Granulocyte Count (IG) includes promyelocytes, myelocytes and metamyelocytes but does not include bands. Percent differential counts (%) should be interpreted in the context of the absolute cell counts (cells/UL).    Lymphocytes % 07/08/2025 35.6  13.0 - 44.0 % Final    Monocytes % 07/08/2025 15.1  2.0 - 10.0 % Final    Eosinophils % 07/08/2025 0.0  0.0 - 6.0 % Final    Basophils % 07/08/2025 0.5  0.0 - 2.0 % Final    Neutrophils Absolute 07/08/2025 3.20  1.20 - 7.70 x10*3/uL Final    Percent differential counts (%) should be interpreted in the context of the absolute cell counts (cells/uL).    Immature Granulocytes Absolute, Au* 07/08/2025 0.04  0.00 - 0.70 x10*3/uL Final    Lymphocytes Absolute 07/08/2025 2.36  1.20 - 4.80 x10*3/uL Final    Monocytes Absolute 07/08/2025 1.00  0.10 - 1.00 x10*3/uL Final    Eosinophils Absolute 07/08/2025 0.00  0.00 - 0.70 x10*3/uL Final    Basophils Absolute 07/08/2025 0.03  0.00 - 0.10 x10*3/uL Final    Automated WBC differential has been confirmed by manual smear review    Glucose 07/08/2025 117 (H)  74 - 99 mg/dL Final    Sodium 07/08/2025 138  136 - 145 mmol/L Final    Potassium 07/08/2025 3.4 (L)  3.5 - 5.3 mmol/L Final    Chloride 07/08/2025 104  98 - 107 mmol/L Final    Bicarbonate 07/08/2025 26  21 - 32 mmol/L Final    Anion Gap 07/08/2025 11  10 - 20 mmol/L Final    Urea Nitrogen 07/08/2025 9  6 - 23 mg/dL Final    Creatinine 07/08/2025 0.60  0.50 - 1.05 mg/dL Final    eGFR 07/08/2025 >90  >60 mL/min/1.73m*2 Final    Calculations of estimated GFR are  performed using the 2021 CKD-EPI Study Refit equation without the race variable for the IDMS-Traceable creatinine methods.  https://jasn.asnjournals.org/content/early/2021/09/22/ASN.6612678480    Calcium 07/08/2025 8.2 (L)  8.6 - 10.3 mg/dL Final    Albumin 07/08/2025 3.4  3.4 - 5.0 g/dL Final    Alkaline Phosphatase 07/08/2025 71  33 - 136 U/L Final    Total Protein 07/08/2025 5.7 (L)  6.4 - 8.2 g/dL Final    AST 07/08/2025 16  9 - 39 U/L Final    Bilirubin, Total 07/08/2025 0.9  0.0 - 1.2 mg/dL Final    ALT 07/08/2025 11  7 - 45 U/L Final    Patients treated with Sulfasalazine may generate falsely decreased results for ALT.    Magnesium 07/08/2025 1.87  1.60 - 2.40 mg/dL Final    Phosphorus 07/08/2025 3.5  2.5 - 4.9 mg/dL Final    Lipase 07/08/2025 7 (L)  9 - 82 U/L Final    Troponin I, High Sensitivity 07/08/2025 4  0 - 13 ng/L Final   Office Visit on 07/08/2025   Component Date Value Ref Range Status    POC Sars-Cov-2 PCR 07/08/2025 Negative  Negative Final    POC Respiratory Syncytial Virus PCR 07/08/2025 Negative  Negative Final    POC Influenza A Virus PCR 07/08/2025 Negative  Negative Final    POC Influenza B Virus PCR 07/08/2025 Negative  Negative Final    POC Human Rhinovirus PCR 07/08/2025 Negative  Negative Final   Office Visit on 06/25/2025   Component Date Value Ref Range Status    CHOLESTEROL, TOTAL 07/07/2025 172  <200 mg/dL Final    HDL CHOLESTEROL 07/07/2025 37 (L)  > OR = 50 mg/dL Final    TRIGLYCERIDES 07/07/2025 133  <150 mg/dL Final    LDL-CHOLESTEROL 07/07/2025 110 (H)  mg/dL (calc) Final    Comment: Reference range: <100     Desirable range <100 mg/dL for primary prevention;    <70 mg/dL for patients with CHD or diabetic patients   with > or = 2 CHD risk factors.     LDL-C is now calculated using the Dario-Madina   calculation, which is a validated novel method providing   better accuracy than the Friedewald equation in the   estimation of LDL-C.   Dario FERNANDEZ et al. DAVID. 2013;310(19):  2061-2068   (http://education.LiveMusicMachine.Com/faq/UFG009)      CHOL/HDLC RATIO 07/07/2025 4.6  <5.0 (calc) Final    NON HDL CHOLESTEROL 07/07/2025 135 (H)  <130 mg/dL (calc) Final    Comment: For patients with diabetes plus 1 major ASCVD risk   factor, treating to a non-HDL-C goal of <100 mg/dL   (LDL-C of <70 mg/dL) is considered a therapeutic   option.      TSH W/REFLEX TO FT4 07/07/2025 1.83  0.40 - 4.50 mIU/L Final    VITAMIN D,25-OH,TOTAL,IA 07/07/2025 33  30 - 100 ng/mL Final    Comment: Vitamin D Status         25-OH Vitamin D:     Deficiency:                    <20 ng/mL  Insufficiency:             20 - 29 ng/mL  Optimal:                 > or = 30 ng/mL     For 25-OH Vitamin D testing on patients on   D2-supplementation and patients for whom quantitation   of D2 and D3 fractions is required, the Doculogy()  25-OH VIT D, (D2,D3), LC/MS/MS is recommended: order   code 40235 (patients >2yrs).     See Note 1     Note 1     For additional information, please refer to   http://education.LiveMusicMachine.Com/faq/WPK748   (This link is being provided for informational/  educational purposes only.)      HEMOGLOBIN A1c 07/07/2025 5.3  <5.7 % Final    Comment: For the purpose of screening for the presence of  diabetes:     <5.7%       Consistent with the absence of diabetes  5.7-6.4%    Consistent with increased risk for diabetes              (prediabetes)  > or =6.5%  Consistent with diabetes     This assay result is consistent with a decreased risk  of diabetes.     Currently, no consensus exists regarding use of  hemoglobin A1c for diagnosis of diabetes in children.     According to American Diabetes Association (ADA)  guidelines, hemoglobin A1c <7.0% represents optimal  control in non-pregnant diabetic patients. Different  metrics may apply to specific patient populations.   Standards of Medical Care in Diabetes(ADA).          eAG (mg/dL) 07/07/2025 105  mg/dL Final    eAG (mmol/L) 07/07/2025 5.8  mmol/L  Final    GLUCOSE 07/07/2025 108 (H)  65 - 99 mg/dL Final    Comment:               Fasting reference interval     For someone without known diabetes, a glucose value  between 100 and 125 mg/dL is consistent with  prediabetes and should be confirmed with a  follow-up test.         UREA NITROGEN (BUN) 07/07/2025 9  7 - 25 mg/dL Final    CREATININE 07/07/2025 0.68  0.50 - 1.05 mg/dL Final    EGFR 07/07/2025 98  > OR = 60 mL/min/1.73m2 Final    SODIUM 07/07/2025 140  135 - 146 mmol/L Final    POTASSIUM 07/07/2025 3.6  3.5 - 5.3 mmol/L Final    CHLORIDE 07/07/2025 106  98 - 110 mmol/L Final    CARBON DIOXIDE 07/07/2025 25  20 - 32 mmol/L Final    ELECTROLYTE BALANCE 07/07/2025 9  7 - 17 mmol/L (calc) Final    CALCIUM 07/07/2025 8.2 (L)  8.6 - 10.4 mg/dL Final    PROTEIN, TOTAL 07/07/2025 5.4 (L)  6.1 - 8.1 g/dL Final    ALBUMIN 07/07/2025 3.6  3.6 - 5.1 g/dL Final    BILIRUBIN, TOTAL 07/07/2025 0.9  0.2 - 1.2 mg/dL Final    ALKALINE PHOSPHATASE 07/07/2025 74  37 - 153 U/L Final    AST 07/07/2025 15  10 - 35 U/L Final    ALT 07/07/2025 11  6 - 29 U/L Final         Patient Care Team:  RUDI Joseph-CNP as PCP - General (Internal Medicine)  Zoila Houser MD as Referring Physician (Hematology and Oncology)     Review of Systems   Constitutional:  Negative for chills and fever.   Respiratory:  Negative for cough.    Cardiovascular:  Positive for palpitations. Negative for chest pain and leg swelling.   Gastrointestinal:  Positive for diarrhea (one time this morning, prior to this morning last liquid stool sunday 7/6). Negative for nausea and vomiting.   Neurological:  Negative for dizziness (none since ED).       Objective   Vitals:  /75   Pulse 88   Temp 36.3 °C (97.3 °F)   Ht (!) 1.524 m (5')   Wt 102 kg (224 lb)   SpO2 95%   BMI 43.75 kg/m²       Surgical History[2]    Current Outpatient Medications   Medication Instructions    albuterol (Ventolin HFA) 90 mcg/actuation inhaler 2 puffs,  "inhalation, Every 4 hours PRN    albuterol 2.5 mg, nebulization, 4 times daily PRN    alcohol swabs 1 Box, topical (top), Weekly    buPROPion XL (WELLBUTRIN XL) 300 mg, oral, Every morning, Do not crush, chew, or split.    cholecalciferol (VITAMIN D-3) 25 mcg, oral, Daily    cyanocobalamin, vitamin B-12, 1,000 mcg/mL kit Administer 1000 mcg injection once per week for 4 weeks. Then, administer 1000 mcg injection every 28 days.    Estring 2 mg, vaginal, Every 3 months, follow package directions    ferrous sulfate (Chris-In-Sol) 15 mg iron (75 mg)/mL drops 15 mg of elemental iron, oral, 2 times weekly    fluticasone (Flonase) 50 mcg/actuation nasal spray Administer 2 sprays per nostril once daily 1 hour before bedtime. Shake gently then remove cap and point spray tip sideways toward your ears before applying. Prime pump before first use. After use, clean tip.    folic acid (FOLVITE) 1 mg, oral, Daily    loratadine (CLARITIN) 10 mg, Daily RT    mv-calcium-min-iron fm-FA-vitK (Multi For Her) 18 mg iron-600 mcg-80 mcg tablet Take by mouth.    needle, disp, 25 gauge 25 gauge x 5/8\" needle 10 each, miscellaneous, Weekly    polyethylene glycol-electrolytes (Nulytely) 420 gram solution Drink 1/2 starting at 6 pm the night before your procedure then drink the 2nd 1/2 5 hours before procedure arrival tme       Physical Exam  Constitutional:       General: She is not in acute distress.     Appearance: She is not ill-appearing or toxic-appearing.   Cardiovascular:      Rate and Rhythm: Normal rate and regular rhythm.      Pulses:           Posterior tibial pulses are 1+ on the right side and 1+ on the left side.   Pulmonary:      Effort: No tachypnea.      Breath sounds: No decreased breath sounds, wheezing, rhonchi or rales.   Abdominal:      General: Bowel sounds are normal.      Palpations: Abdomen is soft.   Musculoskeletal:      Right lower leg: No edema.      Left lower leg: No edema.   Skin:     General: Skin is warm.      " Capillary Refill: Capillary refill takes less than 2 seconds.      Findings: No rash.   Neurological:      Mental Status: She is alert and oriented to person, place, and time.   Psychiatric:         Attention and Perception: Attention normal.         Mood and Affect: Mood normal.         Speech: Speech normal.         Cognition and Memory: Cognition and memory normal.         Assessment & Plan  Palpitations    Cardiology referral placed prior to onset of palpitations for history of chest pain and leon. Scheduled     TSH 1.83 25  Mag 1.87    Orders:    Holter or Event Cardiac Monitor; Future    Comprehensive metabolic panel; Future    Vitamin D insufficiency  Requests prescription of vitamin D  Vitamin D level 33 ng/ml    Orders:    cholecalciferol (Vitamin D-3) 25 mcg (1,000 units) capsule; Take 1 capsule (25 mcg) by mouth once daily.                    [1]   Allergies  Allergen Reactions    Heparin Hives    Heparin Analogues Hives    Nsaids (Non-Steroidal Anti-Inflammatory Drug) Other     had bariatric surgery    Quaternary Ammonium Cmpds,C12-C16 Alkyldimethyl,Cl Hives     quaternary- found in sanitizers/ disinfectives    Penicillins Hives, Rash and Unknown   [2]   Past Surgical History:  Procedure Laterality Date     SECTION, CLASSIC  2013     Section    CHOLECYSTECTOMY  08/15/2020    Cholecystectomy    EYE SURGERY      GASTRIC RESTRICTION SURGERY      Gastric Surgery For Morbid Obesity    HERNIA REPAIR      OTHER SURGICAL HISTORY  2019    Colonoscopy    OTHER SURGICAL HISTORY  08/15/2020    Abdominoplasty    OTHER SURGICAL HISTORY  2017    Corticosteroids Injection Intraarticular Left Knee    OTHER SURGICAL HISTORY  2014    Wrist Surgery Left    ROTATOR CUFF REPAIR  2016    Rotator Cuff Repair    SINUS SURGERY  2013    Sinus Surgery    TONSILLECTOMY  2013    Tonsillectomy

## 2025-07-11 ENCOUNTER — APPOINTMENT (OUTPATIENT)
Dept: PRIMARY CARE | Facility: CLINIC | Age: 60
End: 2025-07-11
Payer: COMMERCIAL

## 2025-07-11 VITALS
HEART RATE: 88 BPM | SYSTOLIC BLOOD PRESSURE: 103 MMHG | OXYGEN SATURATION: 95 % | DIASTOLIC BLOOD PRESSURE: 75 MMHG | TEMPERATURE: 97.3 F | BODY MASS INDEX: 43.98 KG/M2 | WEIGHT: 224 LBS | HEIGHT: 60 IN

## 2025-07-11 DIAGNOSIS — E55.9 VITAMIN D INSUFFICIENCY: ICD-10-CM

## 2025-07-11 DIAGNOSIS — R00.2 PALPITATIONS: Primary | ICD-10-CM

## 2025-07-11 PROCEDURE — 1036F TOBACCO NON-USER: CPT | Performed by: NURSE PRACTITIONER

## 2025-07-11 PROCEDURE — 99214 OFFICE O/P EST MOD 30 MIN: CPT | Performed by: NURSE PRACTITIONER

## 2025-07-11 PROCEDURE — 3008F BODY MASS INDEX DOCD: CPT | Performed by: NURSE PRACTITIONER

## 2025-07-11 RX ORDER — VIT C/E/ZN/COPPR/LUTEIN/ZEAXAN 250MG-90MG
25 CAPSULE ORAL DAILY
Qty: 30 CAPSULE | Refills: 2 | Status: SHIPPED | OUTPATIENT
Start: 2025-07-11 | End: 2025-10-09

## 2025-07-11 ASSESSMENT — ENCOUNTER SYMPTOMS
PALPITATIONS: 1
DIARRHEA: 1
VOMITING: 0
DIZZINESS: 0
COUGH: 0
CHILLS: 0
FEVER: 0
NAUSEA: 0

## 2025-07-11 NOTE — TELEPHONE ENCOUNTER
Pt complained of aerophagia. Called and talked to pt today. She requested settings to be changed to auto-CPAP 5-13 cm H2O. Currently she is already on max EPR level 3. Agree with pt request. PAP order placed. Pls fax to [facility]. Thanks

## 2025-07-11 NOTE — PATIENT INSTRUCTIONS
Irina- it was nice to see you today.     Continue supportive care and intentional hydration   Recommend Holter monitor for 7-14 days for persistent complaint of palpitations. Call [phone] to schedule holter monitoring with [facility].  Recheck labs in one week  Follow up with cardiology as scheduled

## 2025-07-18 DIAGNOSIS — R00.2 PALPITATIONS: ICD-10-CM

## 2025-08-19 ENCOUNTER — TELEPHONE (OUTPATIENT)
Dept: PRIMARY CARE | Facility: CLINIC | Age: 60
End: 2025-08-19

## 2025-08-19 ENCOUNTER — OFFICE VISIT (OUTPATIENT)
Dept: CARDIOLOGY | Facility: HOSPITAL | Age: 60
End: 2025-08-19
Payer: COMMERCIAL

## 2025-08-19 VITALS
HEART RATE: 82 BPM | OXYGEN SATURATION: 97 % | BODY MASS INDEX: 40.9 KG/M2 | SYSTOLIC BLOOD PRESSURE: 107 MMHG | DIASTOLIC BLOOD PRESSURE: 72 MMHG | WEIGHT: 209.44 LBS

## 2025-08-19 DIAGNOSIS — R06.02 SHORTNESS OF BREATH: ICD-10-CM

## 2025-08-19 DIAGNOSIS — R00.2 PALPITATIONS: ICD-10-CM

## 2025-08-19 DIAGNOSIS — E55.9 VITAMIN D INSUFFICIENCY: ICD-10-CM

## 2025-08-19 DIAGNOSIS — D61.818 PANCYTOPENIA: ICD-10-CM

## 2025-08-19 DIAGNOSIS — R07.9 CHEST PAIN, UNSPECIFIED TYPE: Primary | ICD-10-CM

## 2025-08-19 DIAGNOSIS — E53.8 B12 DEFICIENCY: ICD-10-CM

## 2025-08-19 DIAGNOSIS — F32.A DEPRESSION, UNSPECIFIED DEPRESSION TYPE: ICD-10-CM

## 2025-08-19 PROCEDURE — 99212 OFFICE O/P EST SF 10 MIN: CPT

## 2025-08-19 PROCEDURE — 99204 OFFICE O/P NEW MOD 45 MIN: CPT | Performed by: NURSE PRACTITIONER

## 2025-08-19 PROCEDURE — 1036F TOBACCO NON-USER: CPT | Performed by: NURSE PRACTITIONER

## 2025-08-19 ASSESSMENT — ENCOUNTER SYMPTOMS
LIGHT-HEADEDNESS: 1
SHORTNESS OF BREATH: 1
FATIGUE: 1
PALPITATIONS: 1

## 2025-08-20 RX ORDER — FOLIC ACID 1 MG/1
1 TABLET ORAL DAILY
Qty: 90 TABLET | Refills: 0 | Status: SHIPPED | OUTPATIENT
Start: 2025-08-20 | End: 2025-11-18

## 2025-08-20 RX ORDER — FERROUS SULFATE 15 MG/ML
15 DROPS ORAL 2 TIMES WEEKLY
Qty: 30 ML | Refills: 2 | Status: SHIPPED | OUTPATIENT
Start: 2025-08-21

## 2025-08-20 RX ORDER — BUPROPION HYDROCHLORIDE 300 MG/1
300 TABLET ORAL EVERY MORNING
Qty: 90 TABLET | Refills: 0 | Status: SHIPPED | OUTPATIENT
Start: 2025-08-20 | End: 2025-11-18

## 2025-08-20 RX ORDER — VIT C/E/ZN/COPPR/LUTEIN/ZEAXAN 250MG-90MG
25 CAPSULE ORAL DAILY
Qty: 90 CAPSULE | Refills: 0 | Status: SHIPPED | OUTPATIENT
Start: 2025-08-20 | End: 2025-11-18